# Patient Record
Sex: MALE | Employment: FULL TIME | URBAN - METROPOLITAN AREA
[De-identification: names, ages, dates, MRNs, and addresses within clinical notes are randomized per-mention and may not be internally consistent; named-entity substitution may affect disease eponyms.]

---

## 2017-07-21 ENCOUNTER — OFFICE VISIT (OUTPATIENT)
Dept: INTERNAL MEDICINE | Facility: MEDICAL CENTER | Age: 37
End: 2017-07-21
Payer: COMMERCIAL

## 2017-07-21 VITALS
HEART RATE: 90 BPM | HEIGHT: 70 IN | WEIGHT: 174 LBS | DIASTOLIC BLOOD PRESSURE: 76 MMHG | BODY MASS INDEX: 24.91 KG/M2 | OXYGEN SATURATION: 93 % | SYSTOLIC BLOOD PRESSURE: 130 MMHG | TEMPERATURE: 98.1 F

## 2017-07-21 DIAGNOSIS — M54.6 CHRONIC RIGHT-SIDED THORACIC BACK PAIN: ICD-10-CM

## 2017-07-21 DIAGNOSIS — G43.009 MIGRAINE WITHOUT AURA AND WITHOUT STATUS MIGRAINOSUS, NOT INTRACTABLE: ICD-10-CM

## 2017-07-21 DIAGNOSIS — Z76.89 ESTABLISHING CARE WITH NEW DOCTOR, ENCOUNTER FOR: ICD-10-CM

## 2017-07-21 DIAGNOSIS — G89.29 CHRONIC RIGHT-SIDED THORACIC BACK PAIN: ICD-10-CM

## 2017-07-21 PROCEDURE — 99204 OFFICE O/P NEW MOD 45 MIN: CPT | Mod: GC | Performed by: INTERNAL MEDICINE

## 2017-07-21 RX ORDER — TOPIRAMATE 50 MG/1
25 TABLET, FILM COATED ORAL 2 TIMES DAILY
Qty: 60 TAB | Refills: 3 | Status: SHIPPED | OUTPATIENT
Start: 2017-07-21 | End: 2021-06-25

## 2017-07-21 RX ORDER — AMITRIPTYLINE HYDROCHLORIDE 25 MG/1
25 TABLET, FILM COATED ORAL
Qty: 30 TAB | Refills: 2 | Status: SHIPPED | OUTPATIENT
Start: 2017-07-21 | End: 2021-06-25

## 2017-07-21 RX ORDER — RIZATRIPTAN BENZOATE 5 MG/1
5 TABLET ORAL
Qty: 10 TAB | Refills: 3 | Status: SHIPPED | OUTPATIENT
Start: 2017-07-21 | End: 2023-07-12

## 2017-07-21 ASSESSMENT — PATIENT HEALTH QUESTIONNAIRE - PHQ9: CLINICAL INTERPRETATION OF PHQ2 SCORE: 0

## 2017-07-21 NOTE — PATIENT INSTRUCTIONS
Please try to do you lab work, so we can have a better estimate and follow up for your health,    Please make an appointment with the physical therapy so they can help you controlling your pain,    Please call us in case you need ant thing

## 2017-07-21 NOTE — MR AVS SNAPSHOT
"        Rony Bridget   2017 10:30 AM   Office Visit   MRN: [id number]    Department:  [facility] Med - Internal Med   Dept Phone:  [phone]    Description:  Male : 1980   Provider:  Tena Malhotra M.D.           Reason for Visit     Establish Care     Migraine x10 years, also tension headaches x5 years    Back Pain upper back       Allergies as of 2017     No Known Allergies      You were diagnosed with     Migraine without aura and without status migrainosus, not intractable   [231667]       Chronic right-sided thoracic back pain   [5467554]       Establishing care with new doctor, encounter for   [383554]         Vital Signs     Blood Pressure Pulse Temperature Height Weight Body Mass Index    130/76 mmHg 90 36.7 °C (98.1 °F) 1.778 m (5' 10\") 78.926 kg (174 lb) 24.97 kg/m2    Oxygen Saturation Smoking Status                93% Never Smoker           Basic Information     Date Of Birth Sex Race Ethnicity Preferred Language    1980 Male Unable to Obtain Unknown English      Your appointments     Oct 12, 2017  1:15 PM   Established Patient with Tena Malhotra M.D.   [facility] Med - Internal Medicine (--)    [address] [place] [address] [phone]           You will be receiving a confirmation call a few days before your appointment from our automated call confirmation system.              Health Maintenance     Patient has no pending health maintenance at this time      Current Immunizations     No immunizations on file.      Below and/or attached are the medications your provider expects you to take. Review all of your home medications and newly ordered medications with your provider and/or pharmacist. Follow medication instructions as directed by your provider and/or pharmacist. Please keep your medication list with you and share with your provider. Update the information when medications are discontinued, doses are changed, or new " medications (including over-the-counter products) are added; and carry medication information at all times in the event of emergency situations     Allergies:  No Known Allergies          Medications  Valid as of: July 21, 2017 - 12:14 PM    Generic Name Brand Name Tablet Size Instructions for use    Amitriptyline HCl (Tab) ELAVIL 25 MG Take 1 Tab by mouth 1 time daily as needed.        Rizatriptan Benzoate (Tab) MAXALT 5 MG Take 1 Tab by mouth Once PRN for Migraine for up to 1 dose.        Topiramate (Tab) TOPAMAX 50 MG Take 0.5 Tabs by mouth 2 times a day.        .                 Medicines prescribed today were sent to:     [id number] - [place], [place] - [address] [facility]    [address] [place] [address] [place] [address]    Phone: [phone] Fax: [phone]    Open 24 Hours?: No      Medication refill instructions:       If your prescription bottle indicates you have medication refills left, it is not necessary to call your provider’s office. Please contact your pharmacy and they will refill your medication.    If your prescription bottle indicates you do not have any refills left, you may request refills at any time through one of the following ways: The online Guide system (except Urgent Care), by calling your provider’s office, or by asking your pharmacy to contact your provider’s office with a refill request. Medication refills are processed only during regular business hours and may not be available until the next business day. Your provider may request additional information or to have a follow-up visit with you prior to refilling your medication.   *Please Note: Medication refills are assigned a new Rx number when refilled electronically. Your pharmacy may indicate that no refills were authorized even though a new prescription for the same medication is available at the pharmacy. Please request the medicine by name with the pharmacy before contacting your provider for a refill.        Referral     A referral request  has been sent to our patient care coordination department. Please allow 3-5 business days for us to process this request and contact you either by phone or mail. If you do not hear from us by the 5th business day, please call us at [phone].        Instructions    Please try to do you lab work, so we can have a better estimate and follow up for your health,    Please make an appointment with the physical therapy so they can help you controlling your pain,    Please call us in case you need ant thing            MyChart Access Code: Activation code not generated  Current [employer] Status: Active

## 2017-07-21 NOTE — PROGRESS NOTES
"New Patient to Establish    Reason to establish: New patient to establish    CC: establish acre, consult about Migraine headache    HPI: 36-year-old male with previous medical history of migraine that started 10 years ago and it is controlled by medication, the patient also has a history of back pain that is not related to trauma or heavy weight lifting, the pain located on the back of the patient in about 1 inch on the right side between the vertebral column and the scapula, the patient describe the pain as a chronic pain with 3 out of 10 in severity that radiate to the right upper limb and neck, the patient is not using any medication for it and he had an evaluation study for this pain on 2004 by having a nerve study which came back normal, and also an MRI study for his back that showed as the patient described \"herniated disc but not to the degree of surgery indication\" we do not have the MRI report but the patient said he will try to have it because it has been done [place] a different state.  Today the patient presented to our clinic to establish care and to have a consultation about his back pain and about his Migraines, the back pain have not change in severity since 2004, but the migraine headache tends to be more frequent and sever lately, the patient was using sumatriptan, it does not seem to be effective recently,   The patient denies seeing flashing lights or any aura, the patient denies any increasing in back pain severity with weight lifting or excersize, he denies any trauma history.    Problem List for this patient  1.   Migraine without aura and without status migrainosus, not intractable  G43.009   2.   Chronic right-sided thoracic back pain  M54.6  3.   Establishing carewith new doctor, encounter for  Z71.89            History reviewed.past medical history.  1. Migraine headache without aura [G43.009]  2. Backache with radiationsince 2004. [M54.9]     Current Outpatient Prescriptions   Medication Sig " "Dispense Refill   • amitriptyline (ELAVIL) 25 MG Tab Take 1 Tab by mouth 1 time daily as needed. 30 Tab 2   • topiramate (TOPAMAX) 50 MG tablet Take 0.5 Tabs by mouth 2 times a day. 60 Tab 3   • rizatriptan (MAXALT) 5 MG tablet Take 1 Tab by mouth Once PRN for Migraine for up to 1 dose. 10 Tab 3     No current facility-administered medications for this visit.       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     Social History Main Topics   • Smoking status: Never Smoker    • Smokeless tobacco: Never Used   • Alcohol Use: Yes      Comment: socially   • Drug Use: No   • Sexual Activity: Not on file     Other Topics Concern   • Not on file     Social History Narrative   • No narrative on file       History reviewed. family history.  1. Mother, alive and well  2. Father, alive history of heart attach 5 years ago when he was 61 years old.  3: Sister, . Because of leukemia    History reviewed. No pertinent past surgical history.    ROS: As per HPI. Additional pertinent symptoms as noted below.    All others negative    /76 mmHg  Pulse 90  Temp(Src) 36.7 °C (98.1 °F)  Ht 1.778 m (5' 10\")  Wt 78.926 kg (174 lb)  BMI 24.97 kg/m2  SpO2 93%    Physical Exam  General:  Alert and oriented, No apparent distress.    Eyes: Pupils equal and reactive. No scleral icterus.    Throat: Clear no erythema or exudates noted.    Neck: Supple. No lymphadenopathy noted. Thyroid not enlarged.    Lungs: Clear to auscultation and percussion bilaterally.    Cardiovascular: Regular rate and rhythm. No murmurs, rubs or gallops.    Abdomen:  Benign. No rebound or guarding noted.    Extremities: No clubbing, cyanosis, edema.    Skin: Clear. No rash or " "suspicious skin lesions noted.    Other: no back tenderness or any skin redness or inflammation. The patient feel the pain radiate to his neck and to his right upper limbs sometimes, no pain or weakness, numbness for the neck or the upper limbs      Assessment and Plan    1. Migraine without aura and without status migrainosus, not intractable  The headache is mainly around the left eyeball and it is radiating to the whole left scalp, the patient declined seeing any flashing lights or aura, the patient declines any fever, neck stiffness or chills, the patient has been provided with a prescription of rizatriptan (MAXALT) 5 MG tablet, topiramate (TOPAMAX) 50 MG tablet, the patient also instructed to visit the clinic if he did not feel better.  2. Chronic right-sided thoracic back pain  The patient had a 10 years history of back pain,  the pain located on the back of the patient in about 1 inch on the right side between the vertebral column and the scapula, the patient describe the pain as a chronic pain with 3 out of 10 in severity that radiate to the right upper limb and neck, the patient is not using any medication for it and he had an evaluation study for this pain on 2004 by having a nerve study which came back normal, and also an MRI study for his back that showed as the patient described \"herniated disc but not to the degree of surgery indication\" we do not have the MRI report but the patient said he will try to have it because it has been done in a different state.   The assessment is that the patient might have a muscle spasm with trigger point, or might be something else, the plan is to prescribe amitriptyline (ELAVIL) 25 MG Tab to him and refer him to a physical therapy and according to the results we will evaluate and decide about the future plan.      3. 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      Followup: Return in about 3 months (around 10/21/2017).    Risk Assessment (discuss potential complications a function of chronic problems): the risk assessment has been discussed with the patient, see above please.    Complexity (discuss number of co-morbidities): the co-morbidities has been discussed with the patient, please see above.    Signed by: Tena Malhotra M.D.

## 2017-07-23 PROBLEM — G89.29 CHRONIC RIGHT-SIDED THORACIC BACK PAIN: Status: ACTIVE | Noted: 2017-07-23

## 2017-07-23 PROBLEM — Z76.89 ESTABLISHING CARE WITH NEW DOCTOR, ENCOUNTER FOR: Status: ACTIVE | Noted: 2017-07-23

## 2017-07-23 PROBLEM — M54.6 CHRONIC RIGHT-SIDED THORACIC BACK PAIN: Status: ACTIVE | Noted: 2017-07-23

## 2017-07-23 PROBLEM — G43.009 MIGRAINE WITHOUT AURA AND WITHOUT STATUS MIGRAINOSUS, NOT INTRACTABLE: Status: ACTIVE | Noted: 2017-07-23

## 2017-08-02 ENCOUNTER — OFFICE VISIT (OUTPATIENT)
Dept: INTERNAL MEDICINE | Facility: MEDICAL CENTER | Age: 37
End: 2017-08-02
Payer: COMMERCIAL

## 2017-08-02 VITALS
WEIGHT: 174 LBS | DIASTOLIC BLOOD PRESSURE: 80 MMHG | BODY MASS INDEX: 24.91 KG/M2 | HEIGHT: 70 IN | SYSTOLIC BLOOD PRESSURE: 125 MMHG | HEART RATE: 75 BPM | TEMPERATURE: 98.6 F | OXYGEN SATURATION: 96 %

## 2017-08-02 DIAGNOSIS — M54.6 CHRONIC RIGHT-SIDED THORACIC BACK PAIN: ICD-10-CM

## 2017-08-02 DIAGNOSIS — G89.29 CHRONIC RIGHT-SIDED THORACIC BACK PAIN: ICD-10-CM

## 2017-08-02 DIAGNOSIS — G44.209 TENSION HEADACHE: ICD-10-CM

## 2017-08-02 DIAGNOSIS — R07.0 THROAT PAIN IN ADULT: ICD-10-CM

## 2017-08-02 DIAGNOSIS — K21.9 GASTROESOPHAGEAL REFLUX DISEASE WITHOUT ESOPHAGITIS: ICD-10-CM

## 2017-08-02 PROCEDURE — 99214 OFFICE O/P EST MOD 30 MIN: CPT | Mod: GC | Performed by: INTERNAL MEDICINE

## 2017-08-02 RX ORDER — NAPROXEN 500 MG/1
500 TABLET ORAL 2 TIMES DAILY WITH MEALS
Qty: 60 TAB | Refills: 1 | Status: SHIPPED | OUTPATIENT
Start: 2017-08-02 | End: 2021-06-25

## 2017-08-02 RX ORDER — RANITIDINE 150 MG/1
150 TABLET ORAL 2 TIMES DAILY
Qty: 60 TAB | Refills: 11 | Status: SHIPPED | OUTPATIENT
Start: 2017-08-02 | End: 2021-06-25

## 2017-08-02 RX ORDER — TIZANIDINE 4 MG/1
4 TABLET ORAL EVERY 6 HOURS PRN
Qty: 30 TAB | Refills: 3 | Status: SHIPPED | OUTPATIENT
Start: 2017-08-02 | End: 2021-06-25

## 2017-08-02 ASSESSMENT — PAIN SCALES - GENERAL: PAINLEVEL: 4=SLIGHT-MODERATE PAIN

## 2017-08-02 NOTE — PATIENT INSTRUCTIONS
Please do not drink coffee or tea too much because that will make the muscle spasm worse,    Please use a soft collar around your neck especially at night with some heat pads,    Please take the extra strength tylenol from the famrnacy to use it with other medications to treat your headache.    Try to hydrate yourself and having multiple snacks during the day , as the hypoglycemia and the dehydration will worsen the headache.

## 2017-08-02 NOTE — MR AVS SNAPSHOT
"Rony Gill   2017 3:45 PM   Office Visit   MRN: [id number]    Department:  [facility] Med - Internal Med   Dept Phone:  [phone]    Description:  Male : 1980   Provider:  Tena Malhotra M.D.           Reason for Visit     Headache throat pain x 10 days       Allergies as of 2017     No Known Allergies      You were diagnosed with     Tension headache   [307.81.ICD-9-CM]       Chronic right-sided thoracic back pain   [7773239]       Throat pain in adult   [9922077]       Gastroesophageal reflux disease without esophagitis   [356373]         Vital Signs     Blood Pressure Pulse Temperature Height Weight Body Mass Index    125/80 mmHg 75 37 °C (98.6 °F) 1.778 m (5' 10\") 78.926 kg (174 lb) 24.97 kg/m2    Oxygen Saturation Smoking Status                96% Never Smoker           Basic Information     Date Of Birth Sex Race Ethnicity Preferred Language    1980 Male Unable to Obtain Unknown English      Your appointments     Aug 04, 2017 10:30 AM   PT New Evaluation 60 Minutes with JEANETTE Quispe   [facility] ([address])    [address] [place] [id number] [phone]           Please bring Photo ID, Insurance Cards, list of all Medication and copies of any legal documents (such as Living Will, Power of ) If speaking a language besides English please bring an adult . Please arrive 30 minutes prior for check in and registration.            Aug 11, 2017 10:15 AM   PT Follow Up 30 Minutes with JOSÉ LUIS [facility] ([address])    [address] [place] [address]   [phone]            Aug 18, 2017  9:45 AM   PT Follow Up 30 Minutes with JOSÉ LUIS [facility] ([address])    [address] [place] [address]   [phone]            Aug 25, 2017  9:45 AM   PT Follow Up 30 Minutes with Mauri LOCK[facility] [address]    [address]  [address]  [place] [address]   [phone]            Sep 01, 2017  9:45 AM   PT Follow Up 30 Minutes with JOSÉ LUIS [facility] [address]    [address]  [address]  [place] [address]   [phone]            Sep 08, 2017 10:15 AM   PT Follow Up 30 Minutes with JOSÉ LUIS [facility] [address]    [address]  [address]  [place] [address]   [phone]            Oct 12, 2017  1:15 PM   Established Patient with Tena Malhotra M.D.   [facility] Med - Internal Medicine (--)    [address]  [address]  [facility] [address]   [phone]           You will be receiving a confirmation call a few days before your appointment from our automated call confirmation system.              Problem List              ICD-10-CM Priority Class Noted - Resolved    Migraine without aura and without status migrainosus, not intractable G43.009   7/23/2017 - Present    Chronic right-sided thoracic back pain M54.6, G89.29   7/23/2017 - Present    Establishing care with new doctor, encounter for Z71.89   7/23/2017 - Present    Gastroesophageal reflux disease without esophagitis K21.9   8/2/2017 - Present      Health Maintenance        Date Due Completion Dates    IMM DTaP/Tdap/Td Vaccine (1 - Tdap) 11/27/2001 ---    IMM INFLUENZA (1) 9/1/2017 ---            Current Immunizations     No immunizations on file.      Below and/or attached are the medications your provider expects you to take. Review all of your home medications and newly ordered medications with your provider and/or pharmacist. Follow medication instructions as directed by your provider and/or pharmacist. Please keep your medication list with you and share with your provider. Update the information when medications are discontinued, doses are changed, or  new medications (including over-the-counter products) are added; and carry medication information at all times in the event of emergency situations     Allergies:  No Known Allergies          Medications  Valid as of: August 02, 2017 -  4:52 PM    Generic Name Brand Name Tablet Size Instructions for use    Amitriptyline HCl (Tab) ELAVIL 25 MG Take 1 Tab by mouth 1 time daily as needed.        Naproxen (Tab) NAPROSYN 500 MG Take 1 Tab by mouth 2 times a day, with meals.        RaNITidine HCl (Tab) ZANTAC 150 MG Take 1 Tab by mouth 2 times a day.        Rizatriptan Benzoate (Tab) MAXALT 5 MG Take 1 Tab by mouth Once PRN for Migraine for up to 1 dose.        TiZANidine HCl (Tab) ZANAFLEX 4 MG Take 1 Tab by mouth every 6 hours as needed.        Topiramate (Tab) TOPAMAX 50 MG Take 0.5 Tabs by mouth 2 times a day.        .                 Medicines prescribed today were sent to:     [facility] [address] [address]    Phone: [phone] Fax: [phone]    Open 24 Hours?: No      Medication refill instructions:       If your prescription bottle indicates you have medication refills left, it is not necessary to call your provider’s office. Please contact your pharmacy and they will refill your medication.    If your prescription bottle indicates you do not have any refills left, you may request refills at any time through one of the following ways: The online [employer] system (except Urgent Care), by calling your provider’s office, or by asking your pharmacy to contact your provider’s office with a refill request. Medication refills are processed only during regular business hours and may not be available until the next business day. Your provider may request additional information or to have a follow-up visit with you prior to refilling your medication.   *Please Note: Medication refills are assigned a new Rx number when refilled electronically. Your pharmacy may indicate  that no refills were authorized even though a new prescription for the same medication is available at the pharmacy. Please request the medicine by name with the pharmacy before contacting your provider for a refill.        Instructions    Please do not drink coffee or tea too much because that will make the muscle spasm worse,    Please use a soft collar around your neck especially at night with some heat pads,    Please take the extra strength tylenol from the famrnacy to use it with other medications to treat your headache.    Try to hydrate yourself and having multiple snakes during the day , as the hypoglycemia and the dehydration will worsen the headache.              [employer] Access Code: Activation code not generated  Current [employer] Status: Active

## 2017-08-03 NOTE — PROGRESS NOTES
"      Established Patient    Rony presents today with the following:    CC: headache, and throat pain for 10 days    HPI: 36-year-old male with previous medical history of migraine that started 10 years ago and it is controlled by medications. The patient also has a history of back pain that is not related to trauma or heavy weight lifting. The pain located on the back of the patient in about 1 inch on the right side between the vertebral column and the scapula. The patient describe the pain as a chronic pain with 3 out of 10 in severity that radiate to the right upper limb and neck. The patient had used amitriptyline for it but it was not beneficial. The patient had an evaluation study for this pain on 2004 by having a nerve study which came back normal. The patient also had an MRI study for his back which showed \"herniated disc but not to the degree of surgery indication\" as the patient described. We do not have the MRI report but the patient says that he will try to have it. The MRI had been done in a different state.  Today the patient visits our clinic to follow up on his headache and to have a consult about throat pain. The throat pain started 10 days ago. The back pain has not change in severity since 2004. The migraine headache is improving but the patient experience a diffuse headache as a rubber band around his head.   The patient does have a history of GERD that is on and off in onset. He is using over the counter medication for GERD whenever he needs to.   The patient denies seeing flashing lights or aura. The patient denies any increasing in back pain severity with weight lifting or exercise. He denies any trauma history, fever, neck stiffness, cough, vomiting, nausea, ill contact, or recent travel.    Patient Active Problem List    Diagnosis Date Noted   • Gastroesophageal reflux disease without esophagitis 08/02/2017   • Tension headache 08/02/2017   • Throat pain in adult 08/02/2017   • Migraine " "without aura and without status migrainosus, not intractable 07/23/2017   • Chronic right-sided thoracic back pain 07/23/2017   • Establishing care with new doctor, encounter for 07/23/2017       Current Outpatient Prescriptions   Medication Sig Dispense Refill   • tizanidine (ZANAFLEX) 4 MG Tab Take 1 Tab by mouth every 6 hours as needed. 30 Tab 3   • naproxen (NAPROSYN) 500 MG Tab Take 1 Tab by mouth 2 times a day, with meals. 60 Tab 1   • ranitidine (ZANTAC) 150 MG Tab Take 1 Tab by mouth 2 times a day. 60 Tab 11   • rizatriptan (MAXALT) 5 MG tablet Take 1 Tab by mouth Once PRN for Migraine for up to 1 dose. 10 Tab 3   • amitriptyline (ELAVIL) 25 MG Tab Take 1 Tab by mouth 1 time daily as needed. 30 Tab 2   • topiramate (TOPAMAX) 50 MG tablet Take 0.5 Tabs by mouth 2 times a day. 60 Tab 3     No current facility-administered medications for this visit.       ROS: As per HPI. Additional pertinent symptoms as noted below.    All others negative    /80 mmHg  Pulse 75  Temp(Src) 37 °C (98.6 °F)  Ht 1.778 m (5' 10\")  Wt 78.926 kg (174 lb)  BMI 24.97 kg/m2  SpO2 96%    Physical Exam   Constitutional:  oriented to person, place, and time. No distress.   Eyes: Pupils are equal, round, and reactive to light. No scleral icterus.  Neck: Neck supple. No thyromegaly present.   Cardiovascular: Normal rate, regular rhythm and normal heart sounds.  Exam reveals no gallop and no friction rub.  No murmur heard.  Pulmonary/Chest: Breath sounds normal. Chest wall is not dull to percussion.   Musculoskeletal:   no edema.   Lymphadenopathy: no cervical adenopathy  Neurological: alert and oriented to person, place, and time.   Skin: No cyanosis. Nails show no clubbing.  Other:    Note: I have reviewed all pertinent labs and diagnostic tests associated with this visit with specific comments listed under the assessment and plan below    Assessment and Plan    1. Tension headache  The patient is currently complaining of " diffuse headache as a tight band around his head, with no photophobia, lacrimation, or visual disturbances.  Hx of relief with NSAIDs.  Clinical exam negative for any tenderness over sinuses, No focal neurologic deficits.  Impression: Symptoms and findings are suggestive of Tension headache, or possibly other forms of migraine.  Plan:  Tab Naproxen 500mg prn.  Advised to use soft collar.     2. Chronic right-sided thoracic back pain  The patient had the back pain for 8 years. No hx of trauma or shingles.  Clinical Exam negative for any rash, deformity, or restriction of movements.  However generalized tenderness over muscles on palpation.  Etiology of pain is unclear; likely to fibromyalgia.  Plan:  Counseled patient for Yoga and exercise.  Tab Naproxen 500 mg.  Referral to PT given.    3. Throat pain in adult:  Hx of throat pain for 10 days, with no cough fever or chills.  Hx of GERD present.  Clinically No congestion, no lymphadenopathy, afebrile.  Possibly acid reflux causing chemical pharyngitis.  Plan:   Ranitidin.  Advised pt to avoid Caffeine, Ice-creams, chocolates.    4. Gastroesophageal reflux disease without esophagitis  Hx of heart burn, reflux symptoms off and on, relieved on taking Ranitidin and OTC meds.  Declined any hx of chest pain, shortness of breath.  Clinical exam: Unremarkable  Impression: Symptoms suggestive of Chr GERD.  Plan:  Ranitidin.  Advised pt to avoid Caffeine, Ice-creams, chocolates.        Followup: Return in about 3 months (around 11/2/2017).      Signed by: Tena Malhotra M.D.

## 2017-08-04 ENCOUNTER — HOSPITAL ENCOUNTER (OUTPATIENT)
Dept: PHYSICAL THERAPY | Facility: REHABILITATION | Age: 37
End: 2017-08-04
Attending: STUDENT IN AN ORGANIZED HEALTH CARE EDUCATION/TRAINING PROGRAM
Payer: COMMERCIAL

## 2017-08-04 PROCEDURE — 97162 PT EVAL MOD COMPLEX 30 MIN: CPT

## 2017-08-04 PROCEDURE — 97012 MECHANICAL TRACTION THERAPY: CPT

## 2017-08-08 ENCOUNTER — HOSPITAL ENCOUNTER (OUTPATIENT)
Dept: PHYSICAL THERAPY | Facility: REHABILITATION | Age: 37
End: 2017-08-08
Attending: STUDENT IN AN ORGANIZED HEALTH CARE EDUCATION/TRAINING PROGRAM
Payer: COMMERCIAL

## 2017-08-08 PROCEDURE — 97140 MANUAL THERAPY 1/> REGIONS: CPT

## 2017-08-08 PROCEDURE — 97012 MECHANICAL TRACTION THERAPY: CPT

## 2017-08-08 PROCEDURE — 97110 THERAPEUTIC EXERCISES: CPT

## 2017-08-10 ENCOUNTER — HOSPITAL ENCOUNTER (OUTPATIENT)
Dept: PHYSICAL THERAPY | Facility: REHABILITATION | Age: 37
End: 2017-08-10
Attending: STUDENT IN AN ORGANIZED HEALTH CARE EDUCATION/TRAINING PROGRAM
Payer: COMMERCIAL

## 2017-08-10 PROCEDURE — 97014 ELECTRIC STIMULATION THERAPY: CPT

## 2017-08-10 PROCEDURE — 97140 MANUAL THERAPY 1/> REGIONS: CPT

## 2017-08-11 ENCOUNTER — APPOINTMENT (OUTPATIENT)
Dept: PHYSICAL THERAPY | Facility: REHABILITATION | Age: 37
End: 2017-08-11
Attending: STUDENT IN AN ORGANIZED HEALTH CARE EDUCATION/TRAINING PROGRAM
Payer: COMMERCIAL

## 2017-08-15 ENCOUNTER — HOSPITAL ENCOUNTER (OUTPATIENT)
Dept: PHYSICAL THERAPY | Facility: REHABILITATION | Age: 37
End: 2017-08-15
Attending: STUDENT IN AN ORGANIZED HEALTH CARE EDUCATION/TRAINING PROGRAM
Payer: COMMERCIAL

## 2017-08-15 PROCEDURE — 97140 MANUAL THERAPY 1/> REGIONS: CPT

## 2017-08-15 PROCEDURE — 97014 ELECTRIC STIMULATION THERAPY: CPT

## 2017-08-15 PROCEDURE — 97110 THERAPEUTIC EXERCISES: CPT

## 2017-08-18 ENCOUNTER — APPOINTMENT (OUTPATIENT)
Dept: PHYSICAL THERAPY | Facility: REHABILITATION | Age: 37
End: 2017-08-18
Attending: STUDENT IN AN ORGANIZED HEALTH CARE EDUCATION/TRAINING PROGRAM
Payer: COMMERCIAL

## 2017-08-22 ENCOUNTER — HOSPITAL ENCOUNTER (OUTPATIENT)
Dept: PHYSICAL THERAPY | Facility: REHABILITATION | Age: 37
End: 2017-08-22
Attending: STUDENT IN AN ORGANIZED HEALTH CARE EDUCATION/TRAINING PROGRAM
Payer: COMMERCIAL

## 2017-08-22 PROCEDURE — 97110 THERAPEUTIC EXERCISES: CPT

## 2017-08-22 PROCEDURE — 97140 MANUAL THERAPY 1/> REGIONS: CPT

## 2017-08-25 ENCOUNTER — APPOINTMENT (OUTPATIENT)
Dept: PHYSICAL THERAPY | Facility: REHABILITATION | Age: 37
End: 2017-08-25
Attending: STUDENT IN AN ORGANIZED HEALTH CARE EDUCATION/TRAINING PROGRAM
Payer: COMMERCIAL

## 2017-09-01 ENCOUNTER — APPOINTMENT (OUTPATIENT)
Dept: PHYSICAL THERAPY | Facility: REHABILITATION | Age: 37
End: 2017-09-01
Attending: STUDENT IN AN ORGANIZED HEALTH CARE EDUCATION/TRAINING PROGRAM
Payer: COMMERCIAL

## 2017-09-08 ENCOUNTER — APPOINTMENT (OUTPATIENT)
Dept: PHYSICAL THERAPY | Facility: REHABILITATION | Age: 37
End: 2017-09-08
Attending: STUDENT IN AN ORGANIZED HEALTH CARE EDUCATION/TRAINING PROGRAM
Payer: COMMERCIAL

## 2017-10-12 ENCOUNTER — APPOINTMENT (OUTPATIENT)
Dept: INTERNAL MEDICINE | Facility: MEDICAL CENTER | Age: 37
End: 2017-10-12
Payer: COMMERCIAL

## 2019-06-21 ENCOUNTER — HOSPITAL ENCOUNTER (OUTPATIENT)
Dept: RADIOLOGY | Facility: MEDICAL CENTER | Age: 39
End: 2019-06-21
Attending: PHYSICIAN ASSISTANT
Payer: COMMERCIAL

## 2019-06-21 DIAGNOSIS — M25.552 LEFT HIP PAIN: ICD-10-CM

## 2019-06-21 PROCEDURE — 73502 X-RAY EXAM HIP UNI 2-3 VIEWS: CPT | Mod: LT

## 2021-06-25 ENCOUNTER — OFFICE VISIT (OUTPATIENT)
Dept: URGENT CARE | Facility: CLINIC | Age: 41
End: 2021-06-25
Payer: COMMERCIAL

## 2021-06-25 VITALS
OXYGEN SATURATION: 96 % | HEIGHT: 70 IN | WEIGHT: 183 LBS | SYSTOLIC BLOOD PRESSURE: 110 MMHG | DIASTOLIC BLOOD PRESSURE: 80 MMHG | HEART RATE: 90 BPM | TEMPERATURE: 97.2 F | BODY MASS INDEX: 26.2 KG/M2 | RESPIRATION RATE: 16 BRPM

## 2021-06-25 DIAGNOSIS — H61.23 BILATERAL IMPACTED CERUMEN: ICD-10-CM

## 2021-06-25 PROCEDURE — 69210 REMOVE IMPACTED EAR WAX UNI: CPT | Performed by: PHYSICIAN ASSISTANT

## 2021-06-25 RX ORDER — OFLOXACIN 3 MG/ML
5 SOLUTION AURICULAR (OTIC) DAILY
Qty: 10 ML | Refills: 0 | Status: SHIPPED | OUTPATIENT
Start: 2021-06-25 | End: 2022-01-04

## 2021-06-25 ASSESSMENT — ENCOUNTER SYMPTOMS
FEVER: 0
SORE THROAT: 0
CHILLS: 0
COUGH: 0
ABDOMINAL PAIN: 0
VOMITING: 0
NAUSEA: 0
DIARRHEA: 0

## 2021-06-25 NOTE — PROGRESS NOTES
Subjective:     Rony Gill  is a 40 y.o. male who presents for Ear Pain ((L) Ear. x2-3 weeks Ear is fully blocked, pt reports he can barely hear out of it. Pain comes and goes. Pt has had this wax build up previously on (L) Ear. )      HPI    Patient presents urgent care complaining of fullness and muffled sounds to left ear times last 2 to 3 weeks.  Patient notes some waxing and waning of symptoms prior to complete loss of hearing on left side.  Notes past medical history of cerumen impaction and suspects as much.  Denies symptoms to right ear.  Denies fevers chills or cough.  Denies nausea vomiting abdominal pain diarrhea or rash.  Notes that onset felt mild fatigue, now resolved.  Tried OTC eardrops for wax with no improvement.    Review of Systems   Constitutional: Negative for chills and fever.   HENT: Positive for ear pain ( mild discomfort, left) and hearing loss. Negative for ear discharge, sore throat and tinnitus.    Respiratory: Negative for cough.    Gastrointestinal: Negative for abdominal pain, diarrhea, nausea and vomiting.   Skin: Negative for rash.       Medications:    • amitriptyline Tabs  • naproxen Tabs  • PEPCID PO  • raNITidine Tabs  • rizatriptan  • tizanidine Tabs  • topiramate    Allergies: Patient has no known allergies.    Problem List: Rony Gill does not have any pertinent problems on file.    Surgical History:  No past surgical history on file.    Past Social Hx: Rony Gill  reports that he has never smoked. He has never used smokeless tobacco. He reports current alcohol use. He reports that he does not use drugs.     Past Family Hx:  Rony Gill family history includes Heart Attack in his father; Leukemia in his sister; No Known Problems in his mother.     Problem list, medications, and allergies reviewed by myself today in Epic.     Objective:   /80 (BP Location: Left arm, Patient Position: Sitting, BP Cuff Size: Adult)   Pulse 90   Temp 36.2 °C (97.2 °F) (Temporal)    "Resp 16   Ht 1.778 m (5' 10\")   Wt 83 kg (183 lb)   SpO2 96%   BMI 26.26 kg/m²     Physical Exam  Vitals and nursing note reviewed.   Constitutional:       General: He is not in acute distress.     Appearance: He is well-developed. He is not diaphoretic.   HENT:      Head: Normocephalic and atraumatic.      Right Ear: External ear normal. There is impacted cerumen.      Left Ear: External ear normal. There is impacted cerumen.      Nose: Nose normal.      Mouth/Throat:      Pharynx: Uvula midline. Posterior oropharyngeal erythema ( mild PND) present. No oropharyngeal exudate.      Tonsils: No tonsillar abscesses.   Eyes:      General: No scleral icterus.        Right eye: No discharge.         Left eye: No discharge.      Conjunctiva/sclera: Conjunctivae normal.   Pulmonary:      Effort: Pulmonary effort is normal. No respiratory distress.      Breath sounds: No decreased breath sounds, wheezing, rhonchi or rales.   Musculoskeletal:         General: Normal range of motion.      Cervical back: Neck supple.   Lymphadenopathy:      Cervical: No cervical adenopathy.   Skin:     General: Skin is warm and dry.      Coloration: Skin is not pale.   Neurological:      Mental Status: He is alert and oriented to person, place, and time.      Coordination: Coordination normal.       Procedure: Cerumen Removal  Risks and benefits of procedure discussed  Cerumen removed with curette and lavage after softening agent instilled  Patient tolerated well  Post procedure exam with clear canals and normal TMs      Assessment/Plan:   Assessment      1. Bilateral impacted cerumen    Other orders  - Famotidine (PEPCID PO); Take  by mouth.  Techniques of self clearance reviewed with patient  Return to clinic with lack of resolution or progression of symptoms.      I have worn an N95 mask, gloves and eye protection for the entire encounter with this patient.     Differential diagnosis, natural history, supportive care, and indications for " immediate follow-up discussed.

## 2021-12-31 ENCOUNTER — TELEPHONE (OUTPATIENT)
Dept: SCHEDULING | Facility: IMAGING CENTER | Age: 41
End: 2021-12-31

## 2022-01-04 ENCOUNTER — OFFICE VISIT (OUTPATIENT)
Dept: MEDICAL GROUP | Facility: PHYSICIAN GROUP | Age: 42
End: 2022-01-04
Payer: COMMERCIAL

## 2022-01-04 VITALS
SYSTOLIC BLOOD PRESSURE: 112 MMHG | WEIGHT: 176 LBS | RESPIRATION RATE: 16 BRPM | BODY MASS INDEX: 25.2 KG/M2 | TEMPERATURE: 97.2 F | HEART RATE: 102 BPM | DIASTOLIC BLOOD PRESSURE: 76 MMHG | OXYGEN SATURATION: 97 % | HEIGHT: 70 IN

## 2022-01-04 DIAGNOSIS — G43.909 MIGRAINE SYNDROME: ICD-10-CM

## 2022-01-04 DIAGNOSIS — K21.9 GASTROESOPHAGEAL REFLUX DISEASE, UNSPECIFIED WHETHER ESOPHAGITIS PRESENT: ICD-10-CM

## 2022-01-04 DIAGNOSIS — R10.13 EPIGASTRIC PAIN: ICD-10-CM

## 2022-01-04 PROBLEM — M54.6 CHRONIC RIGHT-SIDED THORACIC BACK PAIN: Status: RESOLVED | Noted: 2017-07-23 | Resolved: 2022-01-04

## 2022-01-04 PROBLEM — G89.29 CHRONIC RIGHT-SIDED THORACIC BACK PAIN: Status: RESOLVED | Noted: 2017-07-23 | Resolved: 2022-01-04

## 2022-01-04 PROBLEM — G44.209 TENSION HEADACHE: Status: RESOLVED | Noted: 2017-08-02 | Resolved: 2022-01-04

## 2022-01-04 PROCEDURE — 99204 OFFICE O/P NEW MOD 45 MIN: CPT | Performed by: INTERNAL MEDICINE

## 2022-01-04 RX ORDER — PANTOPRAZOLE SODIUM 40 MG/1
40 TABLET, DELAYED RELEASE ORAL DAILY
Qty: 30 TABLET | Refills: 2 | Status: SHIPPED | OUTPATIENT
Start: 2022-01-04 | End: 2022-02-11

## 2022-01-04 ASSESSMENT — PATIENT HEALTH QUESTIONNAIRE - PHQ9: CLINICAL INTERPRETATION OF PHQ2 SCORE: 0

## 2022-01-04 NOTE — PROGRESS NOTES
Subjective:     CC: Establish care    HISTORY OF THE PRESENT ILLNESS: Patient is a 41 y.o. male. This pleasant patient is here today to establish care and discuss the following issues:    The patient reports chronic acid reflux symptoms for which he takes Pepcid on a daily basis.  He has not had a prior EGD.  He reports 2 episodes of acute epigastric pain.  The most recent episode has progressed over the last week.  He reports associated decreased appetite, weakness, mild nausea.  No fevers or vomiting.  Prior to this current episode, his last episode was approximately 1 month ago.  It lasted a couple of days.  He states he will have some loose stool and then he feels better, but does not note chronic constipation or diarrhea.  He thinks that food intake makes it worse.  He does report a long standing history of mild recurrent abdominal pain, but these 2 episodes are more severe in nature.  He has increased his Pepcid to 2 pills daily and has been taking Tums on an as-needed basis.    Allergies: Patient has no known allergies.    Current Outpatient Medications Ordered in Epic   Medication Sig Dispense Refill   • pantoprazole (PROTONIX) 40 MG Tablet Delayed Response Take 1 Tablet by mouth every day. 30 Tablet 2   • Famotidine (PEPCID PO) Take  by mouth.     • rizatriptan (MAXALT) 5 MG tablet Take 1 Tab by mouth Once PRN for Migraine for up to 1 dose. 10 Tab 3     No current Epic-ordered facility-administered medications on file.       Past Medical History:   Diagnosis Date   • Backache with radiation 2004   • Chronic right-sided thoracic back pain 7/23/2017   • Migraine headache without aura    • Tension headache 8/2/2017       History reviewed. No pertinent surgical history.    Social History     Tobacco Use   • Smoking status: Never Smoker   • Smokeless tobacco: Never Used   Substance Use Topics   • Alcohol use: Yes     Comment: socially   • Drug use: No       Social History     Social History Narrative   • Not on  "file       Family History   Problem Relation Age of Onset   • Leukemia Sister    • No Known Problems Mother    • Heart Attack Father         5 years ago when he was 63 Y old       Health Maintenance: Completed    ROS:   Gen: no fevers/chills  Pulm: no sob, no cough  CV: no chest pain, no palpitations  GI: Per HPI  Neuro: no headaches, no numbness/tingling      Objective:     Exam: /76   Pulse (!) 102   Temp 36.2 °C (97.2 °F)   Resp 16   Ht 1.778 m (5' 10\")   Wt 79.8 kg (176 lb)   SpO2 97%  Body mass index is 25.25 kg/m².    General: Normal appearing. No distress.  HEENT: Normocephalic. Eyes conjunctiva clear lids without ptosis, pupils equal and reactive to light accommodation, oropharynx is without erythema, edema or exudates.   Pulmonary: Clear to ausculation.  Normal effort. No rales, ronchi, or wheezing.  Cardiovascular: Regular rate and rhythm without murmur.   Abdomen: Soft, nontender, nondistended.   Musculoskeletal: No extremity cyanosis, clubbing, or edema.  Psych: Normal mood and affect. Alert and oriented x3. Judgment and insight is normal.    Assessment & Plan:   41 y.o. male with the following -    Epigastric pain  Acute, progressive.  Patient reports 2 episodes of acute epigastric pain with associated decreased appetite, mild nausea, and weakness.  He does have a history of chronic mild intermittent abdominal pain.  He also has acid reflux symptoms for which she takes Pepcid daily.  He denies constipation or diarrhea, but states he will have a loose stool during these episodes which improves his symptoms.  - CBC WITH DIFFERENTIAL; Future  - Comp Metabolic Panel; Future  - H. PYLORI, UREA BREATH TEST, ADULT; Future  - pantoprazole (PROTONIX) 40 MG Tablet Delayed Response; Take 1 Tablet by mouth every day.  Dispense: 30 Tablet; Refill: 2  - [id number] VIEWS; Future  - CELIAC DISEASE AB PANEL; Future    Gastroesophageal reflux disease, unspecified whether esophagitis present  Chronic, " ongoing.  Patient has been diagnosed with GERD clinically, he has not had an EGD.  He takes famotidine daily.  He reports his symptoms have been well controlled.  -Transition to pantoprazole daily given reports of increasing epigastric pain    Migraine syndrome  Chronic, recurrent.  Patient takes rizatriptan as needed for his headaches.  He rarely requires this medication, he cannot recall the last time he had to take it.  -Continue rizatriptan 5 mg as needed for acute migraine      Return in about 1 week (around 1/11/2022) for Epigastric pain.    Please note that this dictation was created using voice recognition software. I have made every reasonable attempt to correct obvious errors, but I expect that there are errors of grammar and possibly content that I did not discover before finalizing the note.

## 2022-01-04 NOTE — PATIENT INSTRUCTIONS
Get lab work done.  Stop Pepcid for 24 hours and then take H. pylori breath test.  After H. pylori breath test, start pantoprazole 40 mg daily.  Schedule abdominal x-ray.

## 2022-01-05 ENCOUNTER — HOSPITAL ENCOUNTER (OUTPATIENT)
Dept: RADIOLOGY | Facility: MEDICAL CENTER | Age: 42
End: 2022-01-05
Attending: INTERNAL MEDICINE
Payer: COMMERCIAL

## 2022-01-05 DIAGNOSIS — R10.13 EPIGASTRIC PAIN: ICD-10-CM

## 2022-01-05 PROCEDURE — 74019 RADEX ABDOMEN 2 VIEWS: CPT

## 2022-01-06 PROBLEM — G43.909 MIGRAINE SYNDROME: Status: ACTIVE | Noted: 2017-07-23

## 2022-01-06 LAB — UREA BREATH TEST QL: NEGATIVE

## 2022-01-12 ENCOUNTER — OFFICE VISIT (OUTPATIENT)
Dept: MEDICAL GROUP | Facility: PHYSICIAN GROUP | Age: 42
End: 2022-01-12
Payer: COMMERCIAL

## 2022-01-12 VITALS
SYSTOLIC BLOOD PRESSURE: 118 MMHG | WEIGHT: 176 LBS | OXYGEN SATURATION: 100 % | DIASTOLIC BLOOD PRESSURE: 62 MMHG | HEIGHT: 70 IN | BODY MASS INDEX: 25.2 KG/M2 | TEMPERATURE: 97.8 F | RESPIRATION RATE: 14 BRPM | HEART RATE: 96 BPM

## 2022-01-12 DIAGNOSIS — R10.13 EPIGASTRIC PAIN: ICD-10-CM

## 2022-01-12 DIAGNOSIS — K21.9 GASTROESOPHAGEAL REFLUX DISEASE, UNSPECIFIED WHETHER ESOPHAGITIS PRESENT: ICD-10-CM

## 2022-01-12 PROCEDURE — 99214 OFFICE O/P EST MOD 30 MIN: CPT | Performed by: INTERNAL MEDICINE

## 2022-01-12 NOTE — PROGRESS NOTES
"Subjective:     CC:   Chief Complaint   Patient presents with   • GI Problem     follow up         HPI:   Rony presents today to discuss the following issues:    The patient reports his pain started to improve at the end of last week, but then returned over the last couple of days.  He has not been able to tie it to any particular food.  He did start taking the pantoprazole.      Past Medical History:   Diagnosis Date   • Backache with radiation 2004   • Chronic right-sided thoracic back pain 7/23/2017   • Migraine headache without aura    • Tension headache 8/2/2017       Social History     Tobacco Use   • Smoking status: Never Smoker   • Smokeless tobacco: Never Used   Substance Use Topics   • Alcohol use: Yes     Comment: socially   • Drug use: No       Current Outpatient Medications Ordered in Epic   Medication Sig Dispense Refill   • pantoprazole (PROTONIX) 40 MG Tablet Delayed Response Take 1 Tablet by mouth every day. 30 Tablet 2   • Famotidine (PEPCID PO) Take  by mouth.     • rizatriptan (MAXALT) 5 MG tablet Take 1 Tab by mouth Once PRN for Migraine for up to 1 dose. 10 Tab 3     No current Epic-ordered facility-administered medications on file.       Allergies:  Patient has no known allergies.    Health Maintenance: Completed    ROS:   Denies any recent fevers or chills. No nausea or vomiting. No chest pains or shortness of breath.      Objective:       Exam:  /62   Pulse 96   Temp 36.6 °C (97.8 °F)   Resp 14   Ht 1.778 m (5' 10\")   Wt 79.8 kg (176 lb)   SpO2 100%   BMI 25.25 kg/m²  Body mass index is 25.25 kg/m².    Gen: Alert and oriented, No apparent distress.      Assessment & Plan:     41 y.o. male with the following -     Epigastric pain  Gastroesophageal reflux disease, unspecified whether esophagitis present  This is a chronic medical condition.  Ongoing.  The patient had labs done, but the results have not been sent to me.  H. pylori breath test on 1/5/2022 was negative.  Abdominal " x-ray on 1/5/2022 showed no evidence of bowel obstruction, it did show moderate amount of stool within the colon.  Will refer to gastroenterology for further evaluation.  -Continue pantoprazole 40 mg daily  - Referral to Gastroenterology    Return in about 3 months (around 4/12/2022) for Following GI consult.    Please note that this dictation was created using voice recognition software. I have made every reasonable attempt to correct obvious errors, but I expect that there are errors of grammar and possibly content that I did not discover before finalizing the note.

## 2022-01-15 ENCOUNTER — HOSPITAL ENCOUNTER (OUTPATIENT)
Facility: MEDICAL CENTER | Age: 42
End: 2022-01-15
Attending: PHYSICIAN ASSISTANT
Payer: COMMERCIAL

## 2022-01-15 ENCOUNTER — OFFICE VISIT (OUTPATIENT)
Dept: URGENT CARE | Facility: CLINIC | Age: 42
End: 2022-01-15
Payer: COMMERCIAL

## 2022-01-15 VITALS
SYSTOLIC BLOOD PRESSURE: 112 MMHG | RESPIRATION RATE: 16 BRPM | HEART RATE: 110 BPM | BODY MASS INDEX: 25.2 KG/M2 | TEMPERATURE: 97.2 F | DIASTOLIC BLOOD PRESSURE: 80 MMHG | WEIGHT: 176 LBS | OXYGEN SATURATION: 98 % | HEIGHT: 70 IN

## 2022-01-15 DIAGNOSIS — R10.13 EPIGASTRIC ABDOMINAL PAIN: ICD-10-CM

## 2022-01-15 DIAGNOSIS — K29.00 ACUTE GASTRITIS WITHOUT HEMORRHAGE, UNSPECIFIED GASTRITIS TYPE: ICD-10-CM

## 2022-01-15 LAB
ALBUMIN SERPL BCP-MCNC: 4.9 G/DL (ref 3.2–4.9)
ALBUMIN/GLOB SERPL: 2 G/DL
ALP SERPL-CCNC: 79 U/L (ref 30–99)
ALT SERPL-CCNC: 17 U/L (ref 2–50)
ANION GAP SERPL CALC-SCNC: 14 MMOL/L (ref 7–16)
APPEARANCE UR: NORMAL
AST SERPL-CCNC: 13 U/L (ref 12–45)
BASOPHILS # BLD AUTO: 0.8 % (ref 0–1.8)
BASOPHILS # BLD: 0.08 K/UL (ref 0–0.12)
BILIRUB SERPL-MCNC: 0.7 MG/DL (ref 0.1–1.5)
BILIRUB UR STRIP-MCNC: NORMAL MG/DL
BUN SERPL-MCNC: 13 MG/DL (ref 8–22)
CALCIUM SERPL-MCNC: 9.8 MG/DL (ref 8.5–10.5)
CHLORIDE SERPL-SCNC: 102 MMOL/L (ref 96–112)
CO2 SERPL-SCNC: 23 MMOL/L (ref 20–33)
COLOR UR AUTO: YELLOW
CREAT SERPL-MCNC: 1.15 MG/DL (ref 0.5–1.4)
EOSINOPHIL # BLD AUTO: 0.06 K/UL (ref 0–0.51)
EOSINOPHIL NFR BLD: 0.6 % (ref 0–6.9)
ERYTHROCYTE [DISTWIDTH] IN BLOOD BY AUTOMATED COUNT: 36.7 FL (ref 35.9–50)
GLOBULIN SER CALC-MCNC: 2.5 G/DL (ref 1.9–3.5)
GLUCOSE SERPL-MCNC: 110 MG/DL (ref 65–99)
GLUCOSE UR STRIP.AUTO-MCNC: NORMAL MG/DL
HCT VFR BLD AUTO: 48.1 % (ref 42–52)
HGB BLD-MCNC: 17.3 G/DL (ref 14–18)
IMM GRANULOCYTES # BLD AUTO: 0.04 K/UL (ref 0–0.11)
IMM GRANULOCYTES NFR BLD AUTO: 0.4 % (ref 0–0.9)
KETONES UR STRIP.AUTO-MCNC: 80 MG/DL
LEUKOCYTE ESTERASE UR QL STRIP.AUTO: NORMAL
LIPASE SERPL-CCNC: 58 U/L (ref 11–82)
LYMPHOCYTES # BLD AUTO: 1.91 K/UL (ref 1–4.8)
LYMPHOCYTES NFR BLD: 20.1 % (ref 22–41)
MCH RBC QN AUTO: 29.9 PG (ref 27–33)
MCHC RBC AUTO-ENTMCNC: 36 G/DL (ref 33.7–35.3)
MCV RBC AUTO: 83.2 FL (ref 81.4–97.8)
MONOCYTES # BLD AUTO: 0.6 K/UL (ref 0–0.85)
MONOCYTES NFR BLD AUTO: 6.3 % (ref 0–13.4)
NEUTROPHILS # BLD AUTO: 6.8 K/UL (ref 1.82–7.42)
NEUTROPHILS NFR BLD: 71.8 % (ref 44–72)
NITRITE UR QL STRIP.AUTO: NORMAL
NRBC # BLD AUTO: 0 K/UL
NRBC BLD-RTO: 0 /100 WBC
PH UR STRIP.AUTO: 6 [PH] (ref 5–8)
PLATELET # BLD AUTO: 224 K/UL (ref 164–446)
PMV BLD AUTO: 10.3 FL (ref 9–12.9)
POTASSIUM SERPL-SCNC: 3.8 MMOL/L (ref 3.6–5.5)
PROT SERPL-MCNC: 7.4 G/DL (ref 6–8.2)
PROT UR QL STRIP: NORMAL MG/DL
RBC # BLD AUTO: 5.78 M/UL (ref 4.7–6.1)
RBC UR QL AUTO: NORMAL
SODIUM SERPL-SCNC: 139 MMOL/L (ref 135–145)
SP GR UR STRIP.AUTO: 1.02
UROBILINOGEN UR STRIP-MCNC: 0.2 MG/DL
WBC # BLD AUTO: 9.5 K/UL (ref 4.8–10.8)

## 2022-01-15 PROCEDURE — 85025 COMPLETE CBC W/AUTO DIFF WBC: CPT

## 2022-01-15 PROCEDURE — 80053 COMPREHEN METABOLIC PANEL: CPT

## 2022-01-15 PROCEDURE — 81002 URINALYSIS NONAUTO W/O SCOPE: CPT | Performed by: PHYSICIAN ASSISTANT

## 2022-01-15 PROCEDURE — 83690 ASSAY OF LIPASE: CPT

## 2022-01-15 PROCEDURE — 99214 OFFICE O/P EST MOD 30 MIN: CPT | Performed by: PHYSICIAN ASSISTANT

## 2022-01-15 RX ORDER — SUCRALFATE 1 G/1
1 TABLET ORAL
Qty: 120 TABLET | Refills: 0 | Status: SHIPPED | OUTPATIENT
Start: 2022-01-15 | End: 2022-02-11

## 2022-01-15 ASSESSMENT — ENCOUNTER SYMPTOMS
DIARRHEA: 0
HEADACHES: 0
NAUSEA: 0
COUGH: 0
BRUISES/BLEEDS EASILY: 0
FEVER: 0
VOMITING: 0
CONSTIPATION: 0
SHORTNESS OF BREATH: 0
DIZZINESS: 0
HEARTBURN: 1
BLOOD IN STOOL: 0
CHILLS: 0
ABDOMINAL PAIN: 1

## 2022-01-15 NOTE — PROGRESS NOTES
Subjective     Rony Gill is a 41 y.o. male who presents with Abdominal Pain (x 1 month off/on, upper abdominal pain and muscle weakness)    HPI:  Rony Gill is a 41 y.o. male who presents for evaluation of abdominal pain and muscle weakness.  Patient was initially seen for the same symptoms by his primary care provider on 1/4/2022.  He had H. pylori test done which was negative.  Patient also notes that he had some lab work done at [facility] but has not received those results.   He was started on Protonix and he is also taking famotidine which is not providing much improvement in his symptoms.  Patient notes that he continues to have fatigue and muscle weakness as well as decreased appetite he is not having any fever/chills, vomiting, blood in the stool, or black/tarry stools.  He says that the abdominal discomfort seems to be worse in the middle of the night and his weakness and fatigue seem to be worse in the mornings.        Review of Systems   Constitutional: Positive for malaise/fatigue. Negative for chills and fever.   Respiratory: Negative for cough and shortness of breath.    Cardiovascular: Negative for chest pain.   Gastrointestinal: Positive for abdominal pain and heartburn. Negative for blood in stool, constipation, diarrhea, melena, nausea and vomiting.   Genitourinary: Negative for dysuria, frequency and urgency.   Neurological: Negative for dizziness and headaches.   Endo/Heme/Allergies: Does not bruise/bleed easily.           PMH:  has a past medical history of Backache with radiation (2004), Chronic right-sided thoracic back pain (7/23/2017), Migraine headache without aura, and Tension headache (8/2/2017).  MEDS:   Current Outpatient Medications:   •  pantoprazole (PROTONIX) 40 MG Tablet Delayed Response, Take 1 Tablet by mouth every day., Disp: 30 Tablet, Rfl: 2  •  Famotidine (PEPCID PO), Take  by mouth., Disp: , Rfl:   •  rizatriptan (MAXALT) 5 MG tablet, Take 1 Tab by mouth Once PRN for  "Migraine for up to 1 dose., Disp: 10 Tab, Rfl: 3  ALLERGIES: No Known Allergies  SURGHX: No past surgical history on file.  SOCHX:  reports that he has never smoked. He has never used smokeless tobacco. He reports current alcohol use. He reports that he does not use drugs.  FH: Family history was reviewed, no pertinent findings to report      Objective     /80 (BP Location: Left arm, Patient Position: Sitting, BP Cuff Size: Large adult)   Pulse (!) 110   Temp 36.2 °C (97.2 °F) (Temporal)   Resp 16   Ht 1.778 m (5' 10\")   Wt 79.8 kg (176 lb)   SpO2 98%   BMI 25.25 kg/m²      Physical Exam  Constitutional:       Appearance: He is well-developed.   HENT:      Head: Normocephalic and atraumatic.      Right Ear: External ear normal.      Left Ear: External ear normal.   Eyes:      Conjunctiva/sclera: Conjunctivae normal.      Pupils: Pupils are equal, round, and reactive to light.   Cardiovascular:      Rate and Rhythm: Normal rate and regular rhythm.      Heart sounds: Normal heart sounds. No murmur heard.      Pulmonary:      Effort: Pulmonary effort is normal.      Breath sounds: Normal breath sounds. No wheezing.   Abdominal:      General: Abdomen is flat. Bowel sounds are normal.      Palpations: Abdomen is soft. There is no hepatomegaly or splenomegaly.      Tenderness: There is abdominal tenderness in the epigastric area and left upper quadrant. There is no right CVA tenderness, left CVA tenderness, guarding or rebound. Negative signs include Amezcua's sign.      Comments: Very mild tenderness in the epigastric region and left upper quadrant without rebound or guarding   Musculoskeletal:      Cervical back: Normal range of motion.   Lymphadenopathy:      Cervical: No cervical adenopathy.   Skin:     General: Skin is warm and dry.      Capillary Refill: Capillary refill takes less than 2 seconds.   Neurological:      Mental Status: He is alert and oriented to person, place, and time.   Psychiatric:    "      Behavior: Behavior normal.         Judgment: Judgment normal.         Assessment & Plan     1. Epigastric abdominal pain  - POCT Urinalysis  - CBC WITH DIFFERENTIAL; Future  - Comp Metabolic Panel; Future  - LIPASE; Future  - sucralfate (CARAFATE) 1 GM Tab; Take 1 Tablet by mouth 4 Times a Day,Before Meals and at Bedtime.  Dispense: 120 Tablet; Refill: 0    2. Acute gastritis without hemorrhage, unspecified gastritis type  - sucralfate (CARAFATE) 1 GM Tab; Take 1 Tablet by mouth 4 Times a Day,Before Meals and at Bedtime.  Dispense: 120 Tablet; Refill: 0      We will obtain a new CMP and CBC and will add on a lipase as well.  He will need to try to contact [facility] for the results of the celiac disease AB panel.  Also recommend he follow-up with his primary care next week for further evaluation and management of his symptoms.       My total time spent caring for the patient on the day of the encounter was 30 minutes.   This does not include time spent on separately billable procedures/tests.        Differential Diagnosis, natural history, and supportive care discussed. Return to the Urgent Care or follow up with your PCP if symptoms fail to resolve, or for any new or worsening symptoms. Emergency room precautions discussed. Patient and/or family appears understanding of information.

## 2022-01-18 ENCOUNTER — TELEMEDICINE (OUTPATIENT)
Dept: MEDICAL GROUP | Facility: PHYSICIAN GROUP | Age: 42
End: 2022-01-18
Payer: COMMERCIAL

## 2022-01-18 VITALS — RESPIRATION RATE: 14 BRPM | BODY MASS INDEX: 25.2 KG/M2 | HEIGHT: 70 IN | WEIGHT: 176 LBS

## 2022-01-18 DIAGNOSIS — M25.50 ARTHRALGIA, UNSPECIFIED JOINT: ICD-10-CM

## 2022-01-18 DIAGNOSIS — R10.12 LUQ PAIN: ICD-10-CM

## 2022-01-18 DIAGNOSIS — M79.10 MYALGIA: ICD-10-CM

## 2022-01-18 DIAGNOSIS — R53.1 WEAKNESS: ICD-10-CM

## 2022-01-18 PROCEDURE — 99214 OFFICE O/P EST MOD 30 MIN: CPT | Mod: 95 | Performed by: INTERNAL MEDICINE

## 2022-01-18 ASSESSMENT — FIBROSIS 4 INDEX: FIB4 SCORE: 0.55

## 2022-01-18 NOTE — PROGRESS NOTES
Virtual Visit: Established Patient   This visit was conducted via Zoom using secure and encrypted videoconferencing technology. The patient was in a private location in the state of [place].    The patient's identity was confirmed and verbal consent was obtained for this virtual visit.    Subjective:   CC:   Chief Complaint   Patient presents with   • Follow-Up     following up on weakness in muscles and joints       Rony Gill is a 41 y.o. male presenting for evaluation and management of:    The patient states that since her last visit he was seen in the ED on 1/15/2022 for worsening abdominal pain, as well as new onset fatigue and weakness.  Lab work was unremarkable.  No imaging was performed. The patient states that after our last visit he developed acute onset fatigue, generalized weakness, sore joints.  He reports pain in his elbows and knees bilaterally.  There is no swelling or redness of the joints.  He also reports continued intermittent headaches.  His abdominal pain has been stable, and is relatively mild, mainly causing food avoidance.  He states he stopped his PPI and has felt a little better.  He does recall previously being on a PPI and having to stop it for brain fog, he does not recall his other symptoms at that time.  He also reports a newer pain in his left abdomen between the rib cage and his hip.  He does not recall a trauma to the area.  He denies fevers, chills, sore throat, cough, shortness of breath, diarrhea or constipation, dysuria or urethral discharge.  He was seen in the ED on 1/15/2022 for worsening of his abdominal pain.      ROS   Denies any recent fevers or chills. No nausea or vomiting. No chest pains or shortness of breath.     No Known Allergies    Current medicines (including changes today)  Current Outpatient Medications   Medication Sig Dispense Refill   • sucralfate (CARAFATE) 1 GM Tab Take 1 Tablet by mouth 4 Times a Day,Before Meals and at Bedtime. 120 Tablet 0   •  "pantoprazole (PROTONIX) 40 MG Tablet Delayed Response Take 1 Tablet by mouth every day. 30 Tablet 2   • Famotidine (PEPCID PO) Take  by mouth.     • rizatriptan (MAXALT) 5 MG tablet Take 1 Tab by mouth Once PRN for Migraine for up to 1 dose. 10 Tab 3     No current facility-administered medications for this visit.       Patient Active Problem List    Diagnosis Date Noted   • Epigastric pain 01/12/2022   • GERD (gastroesophageal reflux disease) 08/02/2017   • Migraine syndrome 07/23/2017       Family History   Problem Relation Age of Onset   • Leukemia Sister    • No Known Problems Mother    • Heart Attack Father         5 years ago when he was 63 Y old       He  has a past medical history of Backache with radiation (2004), Chronic right-sided thoracic back pain (7/23/2017), Migraine headache without aura, and Tension headache (8/2/2017).  He  has no past surgical history on file.       Objective:   Resp 14   Ht 1.778 m (5' 10\")   Wt 79.8 kg (176 lb)   BMI 25.25 kg/m²     Physical Exam:  Constitutional: Alert, no distress, well-groomed.  Skin: No rashes in visible areas.  Eye: Round. Conjunctiva clear, lids normal. No icterus.   ENMT: Lips pink without lesions, good dentition, moist mucous membranes. Phonation normal.  Neck: No masses, no thyromegaly. Moves freely without pain.  Respiratory: Unlabored respiratory effort, no cough or audible wheeze  Psych: Alert and oriented x3, normal affect and mood.       Assessment and Plan:   The following treatment plan was discussed:      Weakness  Arthralgia, unspecified joint  Myalgia  LUQ pain  The patient's abdominal pain is chronic and progressive.  His associated symptoms of generalized weakness, myalgias, arthralgias are new in nature.  Etiology of the patient's symptoms is unclear at this time.  We will perform a comprehensive evaluation including labs for inflammation and autoimmunity as well as CT imaging of his abdomen and pelvis.  - Sed Rate; Future  - CRP " QUANTITIVE (NON-CARDIAC); Future  - TSH; Future  - FREE THYROXINE; Future  - VITAMIN D,25 HYDROXY; Future  - VITAMIN B12; Future  - PARVEZ ANTIBODY WITH REFLEX; Future  - RHEUMATOID ARTHRITIS FACTOR; Future  - CCP  - CREATINE KINASE; Future  - URINALYSIS,CULTURE IF INDICATED; Future  - CT-ABDOMEN-PELVIS WITH & W/O; Future    Follow-up: Return in about 2 weeks (around 2/1/2022) for f/u labs, symptoms.     Please note that this dictation was created using voice recognition software. I have made every reasonable attempt to correct obvious errors, but I expect that there are errors of grammar and possibly content that I did not discover before finalizing the note.

## 2022-01-19 ENCOUNTER — HOSPITAL ENCOUNTER (OUTPATIENT)
Dept: LAB | Facility: MEDICAL CENTER | Age: 42
End: 2022-01-19
Attending: INTERNAL MEDICINE
Payer: COMMERCIAL

## 2022-01-19 DIAGNOSIS — M79.10 MYALGIA: ICD-10-CM

## 2022-01-19 DIAGNOSIS — M25.50 ARTHRALGIA, UNSPECIFIED JOINT: ICD-10-CM

## 2022-01-19 DIAGNOSIS — R53.1 WEAKNESS: ICD-10-CM

## 2022-01-19 LAB
25(OH)D3 SERPL-MCNC: 56 NG/ML (ref 30–100)
AMORPH CRY #/AREA URNS HPF: PRESENT /HPF
APPEARANCE UR: ABNORMAL
BACTERIA #/AREA URNS HPF: NEGATIVE /HPF
BILIRUB UR QL STRIP.AUTO: ABNORMAL
CAOX CRY #/AREA URNS HPF: ABNORMAL /HPF
CK SERPL-CCNC: 46 U/L (ref 0–154)
COLOR UR: YELLOW
CRP SERPL HS-MCNC: <0.3 MG/DL (ref 0–0.75)
EPI CELLS #/AREA URNS HPF: NEGATIVE /HPF
ERYTHROCYTE [SEDIMENTATION RATE] IN BLOOD BY WESTERGREN METHOD: 1 MM/HOUR (ref 0–20)
GLUCOSE UR STRIP.AUTO-MCNC: NEGATIVE MG/DL
HYALINE CASTS #/AREA URNS LPF: ABNORMAL /LPF
KETONES UR STRIP.AUTO-MCNC: 15 MG/DL
LEUKOCYTE ESTERASE UR QL STRIP.AUTO: NEGATIVE
MICRO URNS: ABNORMAL
NITRITE UR QL STRIP.AUTO: NEGATIVE
PH UR STRIP.AUTO: 6 [PH] (ref 5–8)
PROT UR QL STRIP: NEGATIVE MG/DL
RBC # URNS HPF: ABNORMAL /HPF
RBC UR QL AUTO: NEGATIVE
RHEUMATOID FACT SER IA-ACNC: <10 IU/ML (ref 0–14)
SP GR UR STRIP.AUTO: >=1.03
T4 FREE SERPL-MCNC: 1.72 NG/DL (ref 0.93–1.7)
TSH SERPL DL<=0.005 MIU/L-ACNC: 1.58 UIU/ML (ref 0.38–5.33)
UROBILINOGEN UR STRIP.AUTO-MCNC: 0.2 MG/DL
VIT B12 SERPL-MCNC: 476 PG/ML (ref 211–911)
WBC #/AREA URNS HPF: ABNORMAL /HPF

## 2022-01-19 PROCEDURE — 82607 VITAMIN B-12: CPT

## 2022-01-19 PROCEDURE — 86431 RHEUMATOID FACTOR QUANT: CPT

## 2022-01-19 PROCEDURE — 86200 CCP ANTIBODY: CPT

## 2022-01-19 PROCEDURE — 82306 VITAMIN D 25 HYDROXY: CPT

## 2022-01-19 PROCEDURE — 86140 C-REACTIVE PROTEIN: CPT

## 2022-01-19 PROCEDURE — 85652 RBC SED RATE AUTOMATED: CPT

## 2022-01-19 PROCEDURE — 86038 ANTINUCLEAR ANTIBODIES: CPT

## 2022-01-19 PROCEDURE — 84439 ASSAY OF FREE THYROXINE: CPT

## 2022-01-19 PROCEDURE — 82550 ASSAY OF CK (CPK): CPT

## 2022-01-19 PROCEDURE — 84443 ASSAY THYROID STIM HORMONE: CPT

## 2022-01-19 PROCEDURE — 81001 URINALYSIS AUTO W/SCOPE: CPT

## 2022-01-19 PROCEDURE — 36415 COLL VENOUS BLD VENIPUNCTURE: CPT

## 2022-01-21 ENCOUNTER — OFFICE VISIT (OUTPATIENT)
Dept: URGENT CARE | Facility: PHYSICIAN GROUP | Age: 42
End: 2022-01-21
Payer: COMMERCIAL

## 2022-01-21 VITALS
RESPIRATION RATE: 20 BRPM | HEIGHT: 70 IN | DIASTOLIC BLOOD PRESSURE: 88 MMHG | SYSTOLIC BLOOD PRESSURE: 148 MMHG | BODY MASS INDEX: 25.05 KG/M2 | HEART RATE: 104 BPM | WEIGHT: 175 LBS | OXYGEN SATURATION: 97 % | TEMPERATURE: 96.7 F

## 2022-01-21 DIAGNOSIS — F41.9 ANXIETY: ICD-10-CM

## 2022-01-21 DIAGNOSIS — M94.0 COSTOCHONDRITIS: ICD-10-CM

## 2022-01-21 LAB
CCP IGG SERPL-ACNC: 2 UNITS (ref 0–19)
NUCLEAR IGG SER QL IA: NORMAL

## 2022-01-21 PROCEDURE — 93000 ELECTROCARDIOGRAM COMPLETE: CPT | Performed by: STUDENT IN AN ORGANIZED HEALTH CARE EDUCATION/TRAINING PROGRAM

## 2022-01-21 PROCEDURE — 99214 OFFICE O/P EST MOD 30 MIN: CPT | Performed by: STUDENT IN AN ORGANIZED HEALTH CARE EDUCATION/TRAINING PROGRAM

## 2022-01-21 ASSESSMENT — FIBROSIS 4 INDEX: FIB4 SCORE: 0.55

## 2022-01-22 NOTE — PROGRESS NOTES
"Subjective:   CHIEF COMPLAINT  Chief Complaint   Patient presents with   • Chest Pain     left; 30 mins        HPI  Rony Gill is a 41 y.o. male who presents with a chief complaint of left-sided chest pain.  Says his discomfort is well localized about the size of a quarter.  Patient is accompanied by his fiancée and they said they just purchased a house and has they were walking into their new home the patient developed symptoms.  Chest pain was described as a achy discomfort that lasted approximately 2 minutes and spontaneously resolved.  He then had recurrence of the chest pain while sitting in the new house and then came to urgent care for further evaluation.  He did not try taking any medications.  Admits associated symptoms of shortness of breath.  No jaw or arm pain.  No palpitations.  No syncope.  No nausea or vomiting.  No cough or wheezing.  Patient denies ever experiencing chest pain on exertion.  PMH of \"elevated cholesterol\" but not currently on a statin.  No HTN or DM.  Patient does not use tobacco.  FH includes dad with history of MI at age 60, suspected secondary to uncontrolled hypertension; dad is still alive    Interval history: Patient reports he has been experiencing underlying anxiety and possible panic attacks and believes this could be contributing to his symptoms.  Patient is accompanied by his fiancée who is a therapist and says he is not sleeping at night due to underlying anxiety/panic attacks.  He is not currently on any antianxiolytics/antidepressants.    REVIEW OF SYSTEMS  General: no fever or chills  GI: no nausea or vomiting  See HPI for further details.    PAST MEDICAL HISTORY  Patient Active Problem List    Diagnosis Date Noted   • Epigastric pain 01/12/2022   • GERD (gastroesophageal reflux disease) 08/02/2017   • Migraine syndrome 07/23/2017       SURGICAL HISTORY  patient denies any surgical history    ALLERGIES  No Known Allergies    CURRENT MEDICATIONS  Home Medications  " "   Reviewed by Jez Dover, Med Ass't (Medical Assistant) on 01/21/22 at 1842  Med List Status: <None>   Medication Last Dose Status   Famotidine (PEPCID PO) Taking Active   pantoprazole (PROTONIX) 40 MG Tablet Delayed Response Taking Active   rizatriptan (MAXALT) 5 MG tablet Taking Active   sucralfate (CARAFATE) 1 GM Tab Taking Active                SOCIAL HISTORY  Social History     Tobacco Use   • Smoking status: Never Smoker   • Smokeless tobacco: Never Used   Substance and Sexual Activity   • Alcohol use: Yes     Comment: socially   • Drug use: No   • Sexual activity: Not on file       FAMILY HISTORY  Family History   Problem Relation Age of Onset   • Leukemia Sister    • No Known Problems Mother    • Heart Attack Father         5 years ago when he was 63 Y old          Objective:   PHYSICAL EXAM  VITAL SIGNS: /88 (BP Location: Left arm, Patient Position: Sitting, BP Cuff Size: Adult)   Pulse (!) 104   Temp 35.9 °C (96.7 °F) (Temporal)   Resp 20   Ht 1.778 m (5' 10\")   Wt 79.4 kg (175 lb)   SpO2 97%   BMI 25.11 kg/m²     Gen: no acute distress, normal voice  Skin: dry, intact, moist mucosal membranes  Lungs: CTAB w/ symmetric expansion  CV: RRR w/o murmurs or clicks  MSK: Well localized TTP along anterior rib 5 just inferior and adjacent to the nipple.  No step-off or crepitus.  Psych: normal affect, normal judgement, alert, awake    Assessment/Plan:     1. Costochondritis  EKG - Clinic Performed   2. Anxiety  EKG - Clinic Performed   Localized tenderness to palpation consistent with costochondritis.  Certainly there is a component of underlying anxiety contributing to the patient's symptoms.  I ordered EKG but unfortunately the machine is not working and was unable to get a twelve-lead.  However given the patient is a 41-year-old male with no PMH/risk factors for CAD and never experiences exertional chest pain highly unlikely symptoms are due to an ischemic etiology.  I encouraged the patient " to follow-up with his primary care for reevaluation continue management of underlying anxiety.  Okay to try taking Tylenol for the costochondritis (he is avoiding NSAIDs due to gastritis).  If any point he develops new/worsening chest pain he was instructed to go to the emergency room vs alternative urgent care for twelve-lead EKG.  The patient and his fiancée understood everything discussed.  All questions were answered.    Differential diagnosis, natural history, supportive care, and indications for immediate follow-up discussed. All questions answered. Patient agrees with the plan of care.    Follow-up as needed if symptoms worsen or fail to improve to PCP, Urgent care or Emergency Room.    >30 minutes was spent caring for this patient on the day of the encounter which included face-to-face time, discussing the diagnosis, medical management, follow-up, emergency room precautions and completion of the chart. This does not include time spent on separately billable procedures/tests.      Please note that this dictation was created using voice recognition software. I have made a reasonable attempt to correct obvious errors, but I expect that there are errors of grammar and possibly content that I did not discover before finalizing the note.

## 2022-01-26 ENCOUNTER — HOSPITAL ENCOUNTER (OUTPATIENT)
Dept: RADIOLOGY | Facility: MEDICAL CENTER | Age: 42
End: 2022-01-26
Attending: INTERNAL MEDICINE
Payer: COMMERCIAL

## 2022-01-26 DIAGNOSIS — R10.12 LUQ PAIN: ICD-10-CM

## 2022-01-26 PROCEDURE — 74177 CT ABD & PELVIS W/CONTRAST: CPT

## 2022-01-26 PROCEDURE — 700117 HCHG RX CONTRAST REV CODE 255: Performed by: INTERNAL MEDICINE

## 2022-01-26 RX ADMIN — IOHEXOL 25 ML: 240 INJECTION, SOLUTION INTRATHECAL; INTRAVASCULAR; INTRAVENOUS; ORAL at 08:00

## 2022-01-26 RX ADMIN — IOHEXOL 100 ML: 350 INJECTION, SOLUTION INTRAVENOUS at 09:10

## 2022-01-26 NOTE — PROGRESS NOTES
Virtual Visit: Established Patient   This visit was conducted via Zoom using secure and encrypted videoconferencing technology. The patient was in a private location in the state of [place].    The patient's identity was confirmed and verbal consent was obtained for this virtual visit.    Subjective:   CC:   Chief Complaint   Patient presents with   • Follow-Up     Lab results    • Anxiety   • Back Pain     upper left side and middle, few weeks, noticed more recently.        Rony Gill is a 41 y.o. male presenting for evaluation and management of:    In the interim since his last visit the patient was seen in urgent care for chest pain.  He was diagnosed with possible costochondritis as well as anxiety.  He states that he also called 911 last night for chest pain and was told that it was panic/anxiety related.  He states that he is currently having left back pain which is going into his arm and feels that it is tricking him into thinking it is his heart.  He also reports some numbness in his pectoralis muscle on the left side of his neck.  He does report that he has been becoming increasingly bothered by his anxiousness, particularly at night.  His PHQ-9 score was 14.    ROS   Denies any recent fevers or chills. No nausea or vomiting. No chest pains or shortness of breath.     No Known Allergies    Current medicines (including changes today)  Current Outpatient Medications   Medication Sig Dispense Refill   • hydrOXYzine HCl (ATARAX) 25 MG Tab Take 1 Tablet by mouth 3 times a day as needed for Anxiety. 30 Tablet 0   • escitalopram (LEXAPRO) 10 MG Tab Take 1 Tablet by mouth every day. 30 Tablet 0   • pantoprazole (PROTONIX) 40 MG Tablet Delayed Response Take 1 Tablet by mouth every day. 30 Tablet 2   • Famotidine (PEPCID PO) Take  by mouth.     • rizatriptan (MAXALT) 5 MG tablet Take 1 Tab by mouth Once PRN for Migraine for up to 1 dose. 10 Tab 3   • sucralfate (CARAFATE) 1 GM Tab Take 1 Tablet by mouth 4 Times  "a Day,Before Meals and at Bedtime. (Patient not taking: Reported on 1/28/2022) 120 Tablet 0     No current facility-administered medications for this visit.       Patient Active Problem List    Diagnosis Date Noted   • Epigastric pain 01/12/2022   • GERD (gastroesophageal reflux disease) 08/02/2017   • Migraine syndrome 07/23/2017       Family History   Problem Relation Age of Onset   • Leukemia Sister    • No Known Problems Mother    • Heart Attack Father         5 years ago when he was 63 Y old       He  has a past medical history of Backache with radiation (2004), Chronic right-sided thoracic back pain (7/23/2017), Migraine headache without aura, and Tension headache (8/2/2017).  He  has no past surgical history on file.       Objective:   Resp 18   Ht 1.778 m (5' 10\") Comment: per pt  Wt 79.4 kg (175 lb) Comment: per pt  BMI 25.11 kg/m²     Physical Exam:  Constitutional: Alert, no distress, well-groomed.  Skin: No rashes in visible areas.  Eye: Round. Conjunctiva clear, lids normal. No icterus.   ENMT: Lips pink without lesions, good dentition, moist mucous membranes. Phonation normal.  Neck: No masses, no thyromegaly. Moves freely without pain.  Respiratory: Unlabored respiratory effort, no cough or audible wheeze  Psych: Alert and oriented x3, normal affect and mood.       Assessment and Plan:   The following treatment plan was discussed:     Anxiety  Panic attacks  This is an acute condition.  Progressive.  Patient with a number of somatic complaints.    Labs, including CBC, CMP, ESR, CRP, vitamin D, B12, PARVEZ, RF, CCP, CK have all been unremarkable.  H. pylori testing was negative.  Abdominal x-ray on 1/5/2022 showed no evidence of bowel obstruction, it did show moderate amount of stool within the colon.    CT abdomen and pelvis on 1/26/2022 was unremarkable.  The patient is scheduled to see gastroenterology for his GI symptoms in March.  -Start trial of escitalopram 10 mg daily and hydroxyzine as needed " for acute episodes of anxiety as well as insomnia, patient to follow-up in 1 month for symptom assessment  - hydrOXYzine HCl (ATARAX) 25 MG Tab; Take 1 Tablet by mouth 3 times a day as needed for Anxiety.  Dispense: 30 Tablet; Refill: 0  - escitalopram (LEXAPRO) 10 MG Tab; Take 1 Tablet by mouth every day.  Dispense: 30 Tablet; Refill: 0    High serum thyroxine (T4)  This is an acute condition.  Labs from 1/19/2022 showed a normal TSH of 1.58 with an elevated free T4 1.72.  Will repeat labs in 2 weeks.  - TSH; Future  - FREE THYROXINE; Future  - T3 FREE; Future  - TSI; Future  - THYROID PEROXIDASE  (TPO) AB; Future  - ANTITHYROGLOBULIN AB; Future    Follow-up: Return in about 4 weeks (around 2/25/2022) for Anxiety, thyroid labs.    Please note that this dictation was created using voice recognition software. I have made every reasonable attempt to correct obvious errors, but I expect that there are errors of grammar and possibly content that I did not discover before finalizing the note.

## 2022-01-28 ENCOUNTER — TELEMEDICINE (OUTPATIENT)
Dept: MEDICAL GROUP | Facility: PHYSICIAN GROUP | Age: 42
End: 2022-01-28
Payer: COMMERCIAL

## 2022-01-28 VITALS — RESPIRATION RATE: 18 BRPM | WEIGHT: 175 LBS | BODY MASS INDEX: 25.05 KG/M2 | HEIGHT: 70 IN

## 2022-01-28 DIAGNOSIS — F41.0 PANIC ATTACKS: ICD-10-CM

## 2022-01-28 DIAGNOSIS — R79.89 HIGH SERUM THYROXINE (T4): ICD-10-CM

## 2022-01-28 DIAGNOSIS — F41.9 ANXIETY: ICD-10-CM

## 2022-01-28 PROCEDURE — 99214 OFFICE O/P EST MOD 30 MIN: CPT | Mod: 95 | Performed by: INTERNAL MEDICINE

## 2022-01-28 RX ORDER — HYDROXYZINE HYDROCHLORIDE 25 MG/1
25 TABLET, FILM COATED ORAL 3 TIMES DAILY PRN
Qty: 30 TABLET | Refills: 0 | Status: SHIPPED | OUTPATIENT
Start: 2022-01-28 | End: 2023-07-12

## 2022-01-28 RX ORDER — ESCITALOPRAM OXALATE 10 MG/1
10 TABLET ORAL DAILY
Qty: 30 TABLET | Refills: 0 | Status: SHIPPED | OUTPATIENT
Start: 2022-01-28 | End: 2022-02-11

## 2022-01-28 ASSESSMENT — ANXIETY QUESTIONNAIRES
6. BECOMING EASILY ANNOYED OR IRRITABLE: MORE THAN HALF THE DAYS
3. WORRYING TOO MUCH ABOUT DIFFERENT THINGS: NEARLY EVERY DAY
1. FEELING NERVOUS, ANXIOUS, OR ON EDGE: NEARLY EVERY DAY
IF YOU CHECKED OFF ANY PROBLEMS ON THIS QUESTIONNAIRE, HOW DIFFICULT HAVE THESE PROBLEMS MADE IT FOR YOU TO DO YOUR WORK, TAKE CARE OF THINGS AT HOME, OR GET ALONG WITH OTHER PEOPLE: VERY DIFFICULT
2. NOT BEING ABLE TO STOP OR CONTROL WORRYING: NEARLY EVERY DAY
4. TROUBLE RELAXING: NEARLY EVERY DAY
GAD7 TOTAL SCORE: 20
7. FEELING AFRAID AS IF SOMETHING AWFUL MIGHT HAPPEN: NEARLY EVERY DAY
5. BEING SO RESTLESS THAT IT IS HARD TO SIT STILL: NEARLY EVERY DAY

## 2022-01-28 ASSESSMENT — PATIENT HEALTH QUESTIONNAIRE - PHQ9
CLINICAL INTERPRETATION OF PHQ2 SCORE: 1
5. POOR APPETITE OR OVEREATING: 3 - NEARLY EVERY DAY
SUM OF ALL RESPONSES TO PHQ QUESTIONS 1-9: 14

## 2022-01-28 ASSESSMENT — FIBROSIS 4 INDEX: FIB4 SCORE: 0.55

## 2022-02-09 ENCOUNTER — OFFICE VISIT (OUTPATIENT)
Dept: MEDICAL GROUP | Facility: PHYSICIAN GROUP | Age: 42
End: 2022-02-09
Payer: COMMERCIAL

## 2022-02-09 VITALS
DIASTOLIC BLOOD PRESSURE: 84 MMHG | BODY MASS INDEX: 23.77 KG/M2 | HEIGHT: 70 IN | OXYGEN SATURATION: 97 % | HEART RATE: 116 BPM | WEIGHT: 166 LBS | RESPIRATION RATE: 16 BRPM | SYSTOLIC BLOOD PRESSURE: 126 MMHG | TEMPERATURE: 98.3 F

## 2022-02-09 DIAGNOSIS — F41.9 ANXIETY: ICD-10-CM

## 2022-02-09 DIAGNOSIS — R00.2 PALPITATIONS: ICD-10-CM

## 2022-02-09 DIAGNOSIS — F41.0 PANIC ATTACKS: ICD-10-CM

## 2022-02-09 DIAGNOSIS — R79.89 HIGH SERUM THYROXINE (T4): ICD-10-CM

## 2022-02-09 DIAGNOSIS — M54.50 ACUTE RIGHT-SIDED LOW BACK PAIN WITHOUT SCIATICA: ICD-10-CM

## 2022-02-09 PROCEDURE — 99214 OFFICE O/P EST MOD 30 MIN: CPT | Performed by: INTERNAL MEDICINE

## 2022-02-09 ASSESSMENT — FIBROSIS 4 INDEX: FIB4 SCORE: 0.55

## 2022-02-09 NOTE — PROGRESS NOTES
Subjective:     CC:   Chief Complaint   Patient presents with   • Follow-Up   • Heart Problem     infrequent, feel like heart drops down to stomach, lightheadedness, x 1 week    • Back Pain     lower middle, x 1 month or more        HPI:   Rony presents today to discuss the following issues:    The patient states he was unable to tolerate the escitalopram, and he stopped it after the first dose.  He feels that his anxiety is subsiding.  He does continue to get to get a sensation of his heart dropping in his chest.  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  He states it is now occurring more frequently.  It was initially happening only in the evenings, it is now happening throughout the day.  He has been unable to identify a trigger.  He also reports a 1 month history of pain in the center and just to the right of his mid lower back.  No antecedent trauma.  The pain does not radiate down his leg.  He has been sitting in his lounge chair and laying in his bed more frequently due to his variety of symptoms.  He continues to acknowledge that his symptoms may be psychosomatic in nature, but he is still concerned about them.    Past Medical History:   Diagnosis Date   • Backache with radiation 2004   • Chronic right-sided thoracic back pain 7/23/2017   • Migraine headache without aura    • Tension headache 8/2/2017       Social History     Tobacco Use   • Smoking status: Never Smoker   • Smokeless tobacco: Never Used   Vaping Use   • Vaping Use: Never used   Substance Use Topics   • Alcohol use: Yes     Comment: socially   • Drug use: No       Current Outpatient Medications Ordered in Epic   Medication Sig Dispense Refill   • hydrOXYzine HCl (ATARAX) 25 MG Tab Take 1 Tablet by mouth 3 times a day as needed for Anxiety. 30 Tablet 0   • Famotidine (PEPCID PO) Take  by mouth.     • rizatriptan (MAXALT) 5 MG tablet Take 1 Tab by mouth Once PRN  "for Migraine for up to 1 dose. 10 Tab 3   • escitalopram (LEXAPRO) 10 MG Tab Take 1 Tablet by mouth every day. (Patient not taking: Reported on 2/9/2022) 30 Tablet 0   • sucralfate (CARAFATE) 1 GM Tab Take 1 Tablet by mouth 4 Times a Day,Before Meals and at Bedtime. (Patient not taking: Reported on 1/28/2022) 120 Tablet 0   • pantoprazole (PROTONIX) 40 MG Tablet Delayed Response Take 1 Tablet by mouth every day. (Patient not taking: Reported on 2/9/2022) 30 Tablet 2     No current Epic-ordered facility-administered medications on file.       Allergies:  Patient has no known allergies.    Health Maintenance: Completed    ROS:   Denies any recent fevers or chills. No nausea or vomiting. No chest pains or shortness of breath.      Objective:       Exam:  /84   Pulse (!) 116   Temp 36.8 °C (98.3 °F) (Temporal)   Resp 16   Ht 1.778 m (5' 10\")   Wt 75.3 kg (166 lb)   SpO2 97%   BMI 23.82 kg/m²  Body mass index is 23.82 kg/m².    Gen: Alert and oriented, No apparent distress.      Assessment & Plan:     41 y.o. male with the following -     Acute right-sided low back pain without sciatica  This is an acute condition.  Stable.  No red flag symptoms present.  Likely secondary to the patient's more sedentary lifestyle recently.  - DX-LUMBAR SPINE-2 OR 3 VIEWS; Future    Palpitations  This is an acute condition.  Progressive.  Patient reporting frequent episodes of the sensation that his heart is dropping down in his chest.  It is also associated with lightheadedness.  It is happening multiple times per day, so we should be able to capture it on a 48-hour Holter.  - Cardiac Event Monitor; Future    High serum thyroxine (T4)  This is an acute condition.  Labs from 1/19/2022 showed a normal TSH of 1.58 with an elevated free T4 1.72.  Will repeat labs in 2 weeks.  - TSH; Future  - FREE THYROXINE; Future  - T3 FREE; Future  - TSI; Future  - THYROID PEROXIDASE  (TPO) AB; Future  - ANTITHYROGLOBULIN AB; " Future     Anxiety  Panic attacks  This is a chronic condition.  Stable.   The patient continues to have a number of somatic complaints involving multiple organ systems.  He does acknowledge that this could be psychosomatic in nature, but he is still concerned about them and would like to have them evaluated.  He was unable to tolerate a trial of escitalopram.  He has not used in hydroxyzine.  Labs, including CBC, CMP, ESR, CRP, vitamin D, B12, PARVEZ, RF, CCP, CK have all been unremarkable.  H. pylori testing was negative.  Abdominal x-ray on 1/5/2022 showed no evidence of bowel obstruction, it did show moderate amount of stool within the colon.  CT abdomen and pelvis on 1/26/2022 was unremarkable.  The patient is scheduled to see gastroenterology for his GI symptoms in March.  We will continue to address the patient's symptoms in order to identify a physical cause and if unable to find a physical etiology, provide the patient with some reassurance that his symptoms may be psychosomatic in nature.      Return in about 4 weeks (around 3/9/2022) for f/u labs.    Please note that this dictation was created using voice recognition software. I have made every reasonable attempt to correct obvious errors, but I expect that there are errors of grammar and possibly content that I did not discover before finalizing the note.

## 2022-02-11 PROBLEM — F41.9 ANXIETY: Status: ACTIVE | Noted: 2022-02-11

## 2022-02-11 PROBLEM — F41.0 PANIC ATTACKS: Status: ACTIVE | Noted: 2022-02-11

## 2022-02-11 PROBLEM — R00.2 PALPITATIONS: Status: ACTIVE | Noted: 2022-02-11

## 2022-02-11 PROBLEM — M54.50 ACUTE RIGHT-SIDED LOW BACK PAIN WITHOUT SCIATICA: Status: ACTIVE | Noted: 2022-02-11

## 2022-02-11 PROBLEM — R79.89 HIGH SERUM THYROXINE (T4): Status: ACTIVE | Noted: 2022-02-11

## 2022-02-16 ENCOUNTER — HOSPITAL ENCOUNTER (OUTPATIENT)
Dept: RADIOLOGY | Facility: MEDICAL CENTER | Age: 42
End: 2022-02-16
Attending: INTERNAL MEDICINE
Payer: COMMERCIAL

## 2022-02-16 DIAGNOSIS — M54.50 ACUTE RIGHT-SIDED LOW BACK PAIN WITHOUT SCIATICA: ICD-10-CM

## 2022-02-16 PROCEDURE — 72100 X-RAY EXAM L-S SPINE 2/3 VWS: CPT

## 2022-02-19 LAB
T3 SERPL-MCNC: 106 NG/DL (ref 71–180)
T4 FREE SERPL-MCNC: 1.4 NG/DL (ref 0.82–1.77)
TSH SERPL DL<=0.005 MIU/L-ACNC: 0.86 UIU/ML (ref 0.45–4.5)

## 2022-04-14 ENCOUNTER — OFFICE VISIT (OUTPATIENT)
Dept: MEDICAL GROUP | Facility: PHYSICIAN GROUP | Age: 42
End: 2022-04-14
Payer: COMMERCIAL

## 2022-04-14 VITALS
SYSTOLIC BLOOD PRESSURE: 138 MMHG | DIASTOLIC BLOOD PRESSURE: 76 MMHG | TEMPERATURE: 98 F | WEIGHT: 153.13 LBS | RESPIRATION RATE: 20 BRPM | OXYGEN SATURATION: 98 % | HEART RATE: 98 BPM | HEIGHT: 70 IN | BODY MASS INDEX: 21.92 KG/M2

## 2022-04-14 DIAGNOSIS — R00.2 PALPITATIONS: ICD-10-CM

## 2022-04-14 DIAGNOSIS — R20.0 NUMBNESS AND TINGLING: ICD-10-CM

## 2022-04-14 DIAGNOSIS — F41.9 ANXIETY: ICD-10-CM

## 2022-04-14 DIAGNOSIS — R20.2 NUMBNESS AND TINGLING: ICD-10-CM

## 2022-04-14 DIAGNOSIS — R79.89 HIGH SERUM THYROXINE (T4): ICD-10-CM

## 2022-04-14 DIAGNOSIS — F41.0 PANIC ATTACKS: ICD-10-CM

## 2022-04-14 PROCEDURE — 99214 OFFICE O/P EST MOD 30 MIN: CPT | Performed by: INTERNAL MEDICINE

## 2022-04-14 ASSESSMENT — FIBROSIS 4 INDEX: FIB4 SCORE: 0.55

## 2022-04-14 NOTE — PROGRESS NOTES
Subjective:     CC:   Chief Complaint   Patient presents with   • Otalgia   • Dizziness   • Vertigo       HPI:   Rony presents today to discuss the following issues:    The patient reports that in the interim since his last visit his anxiety significantly improved.  His palpitations diminished so he did not schedule the cardiac event monitor. He now reports bilateral shoulder pain with numbness and tingling in his bilateral arms.  He also reports a tingling sensation in his legs bilaterally.  He is getting some dizziness and left ear pain.  He states he had a terrible panic attack today.  He has noticed that when he senses an abnormal body function it will then cause anxiety and has the potential to progress to a full-blown panic attack.      Past Medical History:   Diagnosis Date   • Backache with radiation 2004   • Chronic right-sided thoracic back pain 7/23/2017   • Migraine headache without aura    • Tension headache 8/2/2017       Social History     Tobacco Use   • Smoking status: Never Smoker   • Smokeless tobacco: Never Used   Vaping Use   • Vaping Use: Never used   Substance Use Topics   • Alcohol use: Yes     Comment: socially   • Drug use: No       Current Outpatient Medications Ordered in Epic   Medication Sig Dispense Refill   • hydrOXYzine HCl (ATARAX) 25 MG Tab Take 1 Tablet by mouth 3 times a day as needed for Anxiety. 30 Tablet 0   • Famotidine (PEPCID PO) Take  by mouth.     • rizatriptan (MAXALT) 5 MG tablet Take 1 Tab by mouth Once PRN for Migraine for up to 1 dose. 10 Tab 3     No current Epic-ordered facility-administered medications on file.       Allergies:  Patient has no known allergies.    Health Maintenance: Completed    ROS:   Denies any recent fevers or chills. No nausea or vomiting. No chest pains or shortness of breath.      Objective:       Exam:  /76 (BP Location: Left arm, Patient Position: Sitting, BP Cuff Size: Adult)   Pulse 98   Temp 36.7 °C (98 °F) (Temporal)   Resp 20  "  Ht 1.778 m (5' 10\")   Wt 69.5 kg (153 lb 2 oz)   SpO2 98%   BMI 21.97 kg/m²  Body mass index is 21.97 kg/m².    Gen: Alert and oriented, No apparent distress.  ENT: External auditory canals and TMs normal bilaterally        Assessment & Plan:     41 y.o. male with the following -     Numbness and tingling  Acute medical condition.  The patient reports bilateral shoulder pain with numbness and tingling in his bilateral arms.  He also reports a tingling sensation in his legs bilaterally.  He is getting some dizziness and left ear pain.    - DX-CERVICAL SPINE-2 OR 3 VIEWS; Future    Anxiety  Panic attacks  Chronic medical condition.  Recurrent. The patient reports that in the interim since his last visit his anxiety significantly improved.  His palpitations diminished so he did not schedule the cardiac event monitor.  He now reports a new constellation of symptoms including bilateral shoulder pain with numbness and tingling in his arms and legs bilaterally.  He is also having some dizziness with left ear pain.  He states he had a severe panic attack today.  He reports a pattern that when he senses an abnormal bodily function it will then cause anxiety and has the potential to progress to a full-blown panic attack.  The patient has previously presented with a number of somatic complaints involving multiple organ systems. He does acknowledge that this could be psychosomatic in nature, but he is still concerned about them and would like to have them evaluated.  He was unable to tolerate a trial of escitalopram. Labs, including CBC, CMP, ESR, CRP, vitamin D, B12, PARVEZ, RF, CCP, CK have all been unremarkable.  H. pylori testing was negative.  Abdominal x-ray on 1/5/2022 showed no evidence of bowel obstruction, it did show moderate amount of stool within the colon.  CT abdomen and pelvis on 1/26/2022 was unremarkable.  We will continue to address the patient's symptoms in order to identify a primary physical cause and " if unable to find a physical etiology, provide the patient with some reassurance that his symptoms may be psychosomatic in nature.    Palpitations  Chronic medical condition.  Recurrent.  The patient's symptoms had previously subsided so he did not schedule a cardiac event monitor.  We had previously ordered a 48-hour Holter monitor as the patient was having symptoms multiple times per day, but now they have decreased in frequency so I am changing the order to a 2-week Zio patch.  - Cardiac Event Monitor; Future     High serum thyroxine (T4)  Acute medical condition.  Resolved.  Labs from 1/19/2022 showed a normal TSH of 1.58 with an elevated free T4 1.72.  Will repeat labs in 2 weeks.  Repeat labs on 2/18/2022 showed a normal TSH of 0.861, normal free T4 1.4, and normal T3 of 106.  Antibody testing was not performed hormone levels normalized.     I spent a total of 30 minutes with record review, exam, communication with the patient, communication with other providers, and documentation of this encounter.      Return in about 3 months (around 7/14/2022), or if symptoms worsen or fail to improve, for 3-month f/u visit.    Please note that this dictation was created using voice recognition software. I have made every reasonable attempt to correct obvious errors, but I expect that there are errors of grammar and possibly content that I did not discover before finalizing the note.

## 2022-04-18 ENCOUNTER — HOSPITAL ENCOUNTER (OUTPATIENT)
Dept: RADIOLOGY | Facility: MEDICAL CENTER | Age: 42
End: 2022-04-18
Attending: INTERNAL MEDICINE
Payer: COMMERCIAL

## 2022-04-18 ENCOUNTER — NON-PROVIDER VISIT (OUTPATIENT)
Dept: CARDIOLOGY | Facility: MEDICAL CENTER | Age: 42
End: 2022-04-18
Attending: INTERNAL MEDICINE
Payer: COMMERCIAL

## 2022-04-18 DIAGNOSIS — R20.0 NUMBNESS AND TINGLING: ICD-10-CM

## 2022-04-18 DIAGNOSIS — R20.2 NUMBNESS AND TINGLING: ICD-10-CM

## 2022-04-18 DIAGNOSIS — I49.1 APC (ATRIAL PREMATURE CONTRACTIONS): ICD-10-CM

## 2022-04-18 DIAGNOSIS — R00.2 PALPITATIONS: ICD-10-CM

## 2022-04-18 DIAGNOSIS — R00.0 SINUS TACHYCARDIA: ICD-10-CM

## 2022-04-18 DIAGNOSIS — I49.3 PVC'S (PREMATURE VENTRICULAR CONTRACTIONS): ICD-10-CM

## 2022-04-18 PROCEDURE — 72040 X-RAY EXAM NECK SPINE 2-3 VW: CPT

## 2022-04-18 NOTE — PROGRESS NOTES
Patient enrolled in the 14 day ePatch Holter monitoring program from [employer] per Melissa Carrizales M.D.  >In clinic hook up, monitor serial #[id number].  >Pending EOS.

## 2022-04-24 NOTE — PROGRESS NOTES
"Subjective:     CC:   Chief Complaint   Patient presents with   • Follow-Up     Heart palpitations   • Vertigo     X 5 days         HPI:   Rony presents today to discuss the following issues:    The patient continues to report progressive numbness and tingling in his bilateral arms and legs.  He also reports leg weakness, bilaterally.  In addition, he is having episodes of dizziness and visual disturbances.  He is currently wearing his Zio patch for his palpitations.  He continues to report palpitations, anxiety, and panic attacks related to his physical symptoms.      Past Medical History:   Diagnosis Date   • Backache with radiation 2004   • Chronic right-sided thoracic back pain 7/23/2017   • Migraine headache without aura    • Tension headache 8/2/2017       Social History     Tobacco Use   • Smoking status: Never Smoker   • Smokeless tobacco: Never Used   Vaping Use   • Vaping Use: Never used   Substance Use Topics   • Alcohol use: Yes     Comment: socially   • Drug use: No       Current Outpatient Medications Ordered in Epic   Medication Sig Dispense Refill   • hydrOXYzine HCl (ATARAX) 25 MG Tab Take 1 Tablet by mouth 3 times a day as needed for Anxiety. 30 Tablet 0   • Famotidine (PEPCID PO) Take  by mouth.     • rizatriptan (MAXALT) 5 MG tablet Take 1 Tab by mouth Once PRN for Migraine for up to 1 dose. 10 Tab 3     No current Epic-ordered facility-administered medications on file.       Allergies:  Patient has no known allergies.    Health Maintenance: Completed    ROS:   Denies any recent fevers or chills. No nausea or vomiting. No chest pains or shortness of breath.      Objective:       Exam:  /78 (BP Location: Right arm, Patient Position: Sitting, BP Cuff Size: Adult)   Pulse 94   Temp 36.4 °C (97.6 °F) (Temporal)   Resp 18   Ht 1.778 m (5' 10\")   Wt 69.2 kg (152 lb 9.6 oz)   SpO2 99%   BMI 21.90 kg/m²  Body mass index is 21.9 kg/m².    Gen: Alert and oriented, No apparent " distress.  Lungs: Normal effort, CTA bilaterally, no wheezes, rhonchi, or rales  CV: Regular rate and rhythm. No murmurs, rubs, or gallops.  Ext: No clubbing, cyanosis, edema.  Neuro: Cranial nerves II through XII intact bilaterally, sensation equal throughout, full strength throughout, hyperreflexic symmetrically        Assessment & Plan:     41 y.o. male with the following -     Paresthesias  Vertigo  Blurry vision, bilateral  Nonintractable episodic headache, unspecified headache type  Chronic medical condition.  Progressive.  The patient reports progressive numbness and tingling in his bilateral arms and legs.  The sensation tends to be worse in the mornings when he awakens.  It is associated with bilateral shoulder pain.  He also reports bilateral leg weakness.  He also has chronic headaches.  He is now having dizziness and visual disturbances as well.  Cervical spine x-ray on 4/18/2022 was normal.  - MR-STEALTH BRAIN WITH & W/O; Future    Palpitations  Chronic medical condition.  Progressive.  The patient is currently wearing his Zio patch, will await the results.  He is reporting palpitations while wearing the monitor.      I spent a total of 27 minutes with record review, exam, communication with the patient, communication with other providers, and documentation of this encounter.      Return in about 2 weeks (around 5/10/2022) for MRI results.    Please note that this dictation was created using voice recognition software. I have made every reasonable attempt to correct obvious errors, but I expect that there are errors of grammar and possibly content that I did not discover before finalizing the note.

## 2022-04-26 ENCOUNTER — OFFICE VISIT (OUTPATIENT)
Dept: MEDICAL GROUP | Facility: PHYSICIAN GROUP | Age: 42
End: 2022-04-26
Payer: COMMERCIAL

## 2022-04-26 VITALS
WEIGHT: 152.6 LBS | RESPIRATION RATE: 18 BRPM | DIASTOLIC BLOOD PRESSURE: 78 MMHG | HEIGHT: 70 IN | SYSTOLIC BLOOD PRESSURE: 126 MMHG | HEART RATE: 94 BPM | BODY MASS INDEX: 21.85 KG/M2 | TEMPERATURE: 97.6 F | OXYGEN SATURATION: 99 %

## 2022-04-26 DIAGNOSIS — R51.9 NONINTRACTABLE EPISODIC HEADACHE, UNSPECIFIED HEADACHE TYPE: ICD-10-CM

## 2022-04-26 DIAGNOSIS — R20.2 PARESTHESIAS: ICD-10-CM

## 2022-04-26 DIAGNOSIS — R42 VERTIGO: ICD-10-CM

## 2022-04-26 DIAGNOSIS — H53.8 BLURRY VISION, BILATERAL: ICD-10-CM

## 2022-04-26 PROCEDURE — 99213 OFFICE O/P EST LOW 20 MIN: CPT | Performed by: INTERNAL MEDICINE

## 2022-04-26 ASSESSMENT — FIBROSIS 4 INDEX: FIB4 SCORE: 0.55

## 2022-05-04 PROBLEM — H53.8 BLURRY VISION, BILATERAL: Status: ACTIVE | Noted: 2022-05-04

## 2022-05-04 PROBLEM — R20.2 PARESTHESIAS: Status: ACTIVE | Noted: 2022-05-04

## 2022-05-04 PROBLEM — R42 VERTIGO: Status: ACTIVE | Noted: 2022-05-04

## 2022-05-06 ENCOUNTER — TELEPHONE (OUTPATIENT)
Dept: CARDIOLOGY | Facility: MEDICAL CENTER | Age: 42
End: 2022-05-06
Payer: COMMERCIAL

## 2022-05-06 PROCEDURE — 93228 REMOTE 30 DAY ECG REV/REPORT: CPT | Performed by: INTERNAL MEDICINE

## 2022-05-10 ENCOUNTER — HOSPITAL ENCOUNTER (OUTPATIENT)
Dept: RADIOLOGY | Facility: MEDICAL CENTER | Age: 42
End: 2022-05-10
Attending: INTERNAL MEDICINE
Payer: COMMERCIAL

## 2022-05-10 DIAGNOSIS — R20.2 PARESTHESIAS: ICD-10-CM

## 2022-05-10 DIAGNOSIS — R51.9 NONINTRACTABLE EPISODIC HEADACHE, UNSPECIFIED HEADACHE TYPE: ICD-10-CM

## 2022-05-10 PROCEDURE — A9576 INJ PROHANCE MULTIPACK: HCPCS | Performed by: INTERNAL MEDICINE

## 2022-05-10 PROCEDURE — 700117 HCHG RX CONTRAST REV CODE 255: Performed by: INTERNAL MEDICINE

## 2022-05-10 PROCEDURE — 70553 MRI BRAIN STEM W/O & W/DYE: CPT

## 2022-05-10 RX ADMIN — GADOTERIDOL 15 ML: 279.3 INJECTION, SOLUTION INTRAVENOUS at 10:30

## 2022-05-11 ENCOUNTER — OFFICE VISIT (OUTPATIENT)
Dept: MEDICAL GROUP | Facility: PHYSICIAN GROUP | Age: 42
End: 2022-05-11
Payer: COMMERCIAL

## 2022-05-11 VITALS
DIASTOLIC BLOOD PRESSURE: 68 MMHG | OXYGEN SATURATION: 99 % | SYSTOLIC BLOOD PRESSURE: 116 MMHG | BODY MASS INDEX: 21.19 KG/M2 | HEART RATE: 96 BPM | TEMPERATURE: 97.7 F | RESPIRATION RATE: 16 BRPM | WEIGHT: 148 LBS | HEIGHT: 70 IN

## 2022-05-11 DIAGNOSIS — H53.8 BLURRY VISION, BILATERAL: ICD-10-CM

## 2022-05-11 DIAGNOSIS — F41.9 ANXIETY: ICD-10-CM

## 2022-05-11 DIAGNOSIS — F41.0 PANIC ATTACKS: ICD-10-CM

## 2022-05-11 DIAGNOSIS — R20.2 PARESTHESIAS: ICD-10-CM

## 2022-05-11 DIAGNOSIS — R00.2 PALPITATIONS: ICD-10-CM

## 2022-05-11 DIAGNOSIS — R42 VERTIGO: ICD-10-CM

## 2022-05-11 PROCEDURE — 99214 OFFICE O/P EST MOD 30 MIN: CPT | Performed by: INTERNAL MEDICINE

## 2022-05-11 RX ORDER — ESCITALOPRAM OXALATE 5 MG/1
5 TABLET ORAL DAILY
Qty: 30 TABLET | Refills: 1 | Status: SHIPPED | OUTPATIENT
Start: 2022-05-11 | End: 2022-05-17

## 2022-05-11 ASSESSMENT — FIBROSIS 4 INDEX: FIB4 SCORE: 0.55

## 2022-05-17 RX ORDER — SERTRALINE HYDROCHLORIDE 25 MG/1
25 TABLET, FILM COATED ORAL DAILY
Qty: 30 TABLET | Refills: 2 | Status: SHIPPED | OUTPATIENT
Start: 2022-05-17 | End: 2022-06-14 | Stop reason: SDUPTHER

## 2022-06-12 NOTE — PROGRESS NOTES
GettingSubjective:     CC:   Chief Complaint   Patient presents with   • Follow-Up     Medication follow up         HPI:   Rony presents today to discuss the follow issues:     The patient was unable to tolerate escitalopram as it was causing him to have body jerking and shaking at night.  He was switched to sertraline and reports this is occurring less frequently, but he also has to take the medication in the morning.  He overall is reporting improvement in his palpitations and panic attacks.  He does still report some bilateral jaw pain.  In addition, he is having some weakness, numbness and tingling and some dizziness.  The weakness, numbness and tingling occur on all 4 extremities at different times.  He is also aware of lumps in his bilateral axillary region.  He would like to have Lyme testing done as he has read it can be related to the symptoms he is having.      Past Medical History:   Diagnosis Date   • Backache with radiation 2004   • Chronic right-sided thoracic back pain 7/23/2017   • Migraine headache without aura    • Tension headache 8/2/2017       Social History     Tobacco Use   • Smoking status: Never Smoker   • Smokeless tobacco: Never Used   Vaping Use   • Vaping Use: Never used   Substance Use Topics   • Alcohol use: Yes     Comment: socially   • Drug use: No       Current Outpatient Medications Ordered in Epic   Medication Sig Dispense Refill   • sertraline (ZOLOFT) 25 MG tablet Take 1 Tablet by mouth every day. 90 Tablet 3   • hydrOXYzine HCl (ATARAX) 25 MG Tab Take 1 Tablet by mouth 3 times a day as needed for Anxiety. 30 Tablet 0   • Famotidine (PEPCID PO) Take  by mouth.     • rizatriptan (MAXALT) 5 MG tablet Take 1 Tab by mouth Once PRN for Migraine for up to 1 dose. (Patient not taking: Reported on 6/14/2022) 10 Tab 3     No current Epic-ordered facility-administered medications on file.       Allergies:  Patient has no known allergies.    Health Maintenance: Completed    ROS:   Denies  "any recent fevers or chills. No nausea or vomiting. No chest pains or shortness of breath.      Objective:       Exam:  /60 (BP Location: Right arm, Patient Position: Sitting, BP Cuff Size: Adult)   Pulse 95   Temp 36.2 °C (97.2 °F) (Temporal)   Resp 18   Ht 1.778 m (5' 10\")   Wt 67 kg (147 lb 9.6 oz)   SpO2 98%   BMI 21.18 kg/m²  Body mass index is 21.18 kg/m².    Gen: Alert and oriented, No apparent distress.  Lungs: Normal effort, CTA bilaterally, no wheezes, rhonchi, or rales  CV: Regular rate and rhythm. No murmurs, rubs, or gallops.  Ext: No clubbing, cyanosis, edema.        Assessment & Plan:     41 y.o. male with the following -     Anxiety  Panic attacks  Chronic medical condition.   Improved.    At the patient's last visit was started on escitalopram 5 mg daily.  He was unable to tolerate the medication because it was causing body jerking at night.  He has now been switched to sertraline 25 mg daily and reports improvement in that side effect.  He is also taking the medication in the morning.  He overall reports improvement in his palpitations and panic attacks.  He does still report some somatic complaints such as bilateral jaw pain, dizziness, episodic weakness, numbness and tingling of his extremities.  He is requesting Lyme testing to be done as he is read it can be associated with the symptoms he is having.  -Continue sertraline 25 mg daily, would like for patient to increase up to 50 mg as tolerated for greater improvement in his symptoms  - LYME AB/WESTERN BLOT REFLEX  - sertraline (ZOLOFT) 25 MG tablet; Take 1 Tablet by mouth every day.  Dispense: 90 Tablet; Refill: 3     Palpitations  Chronic medical condition.  Improved. Cardiac event monitor on 5/6/2022 showed predominantly sinus rhythm with an average heart rate of 82, range , rare PACs, rare PVCs.  The patient does report still feeling strong heartbeats occasionally at night.    Paresthesias  Chronic medical condition.  The " patient continues to report numbness and tingling over different body parts as well as associated weakness at times.  The patient has had an extensive lab evaluation including a normal ESR, CRP, CK, PARVEZ, RF, and CCP.      Return in about 3 months (around 9/14/2022) for Anxiety medication.    Please note that this dictation was created using voice recognition software. I have made every reasonable attempt to correct obvious errors, but I expect that there are errors of grammar and possibly content that I did not discover before finalizing the note.

## 2022-06-14 ENCOUNTER — OFFICE VISIT (OUTPATIENT)
Dept: MEDICAL GROUP | Facility: PHYSICIAN GROUP | Age: 42
End: 2022-06-14
Payer: COMMERCIAL

## 2022-06-14 VITALS
BODY MASS INDEX: 21.13 KG/M2 | RESPIRATION RATE: 18 BRPM | DIASTOLIC BLOOD PRESSURE: 60 MMHG | TEMPERATURE: 97.2 F | WEIGHT: 147.6 LBS | OXYGEN SATURATION: 98 % | HEIGHT: 70 IN | SYSTOLIC BLOOD PRESSURE: 124 MMHG | HEART RATE: 95 BPM

## 2022-06-14 DIAGNOSIS — R20.2 PARESTHESIAS: ICD-10-CM

## 2022-06-14 DIAGNOSIS — F41.9 ANXIETY: ICD-10-CM

## 2022-06-14 DIAGNOSIS — R00.2 PALPITATIONS: ICD-10-CM

## 2022-06-14 DIAGNOSIS — F41.0 PANIC ATTACKS: ICD-10-CM

## 2022-06-14 PROCEDURE — 99214 OFFICE O/P EST MOD 30 MIN: CPT | Performed by: INTERNAL MEDICINE

## 2022-06-14 RX ORDER — SERTRALINE HYDROCHLORIDE 25 MG/1
25 TABLET, FILM COATED ORAL DAILY
Qty: 90 TABLET | Refills: 3 | Status: SHIPPED | OUTPATIENT
Start: 2022-06-14 | End: 2022-07-07

## 2022-06-14 ASSESSMENT — FIBROSIS 4 INDEX: FIB4 SCORE: 0.55

## 2022-06-17 LAB — B BURGDOR IGG+IGM SER QL IA: NEGATIVE

## 2023-07-09 NOTE — PROGRESS NOTES
"Subjective:     CC:   Chief Complaint   Patient presents with    Medication Management     sertraline (ZOLOFT) 50 MG Tab - not sure about the dosage and want to discuss it     Medication Refill     omeprazole (PRILOSEC) 20 MG delayed-release capsule         HPI:   Rony presents today for follow-up visit for medication refills. The patient is currently taking sertraline 50 mg daily.  He reports this is effectively controlling his symptoms.  He has not had any recent panic attacks. The patient currently takes omeprazole 20 mg daily for GERD.  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      Past Medical History:   Diagnosis Date    Backache with radiation 2004    Chronic right-sided thoracic back pain 7/23/2017    Migraine headache without aura     Tension headache 8/2/2017       Social History     Tobacco Use    Smoking status: Never    Smokeless tobacco: Never   Vaping Use    Vaping Use: Never used   Substance Use Topics    Alcohol use: Yes     Comment: socially    Drug use: No       Current Outpatient Medications Ordered in Epic   Medication Sig Dispense Refill    omeprazole (PRILOSEC) 20 MG delayed-release capsule Take 1 Capsule by mouth every day. 90 Capsule 3    sertraline (ZOLOFT) 25 MG tablet Take 2 Tablets by mouth every day. 180 Tablet 3     No current Epic-ordered facility-administered medications on file.       Allergies:  Patient has no known allergies.    Health Maintenance: Completed    Review of Systems:  No fevers or chills. No cough, chest pain, or shortness of breath.       Objective:       Exam:  /68   Pulse 89   Temp 36.9 °C (98.4 °F) (Temporal)   Ht 1.778 m (5' 10\")   Wt 82.1 kg (181 lb)   SpO2 98%   BMI 25.97 kg/m²  Body mass index is 25.97 kg/m².    Gen: Alert and oriented, No apparent distress.  Lungs: Normal effort, CTA bilaterally, no wheezes, rhonchi, or rales  CV: Regular rate and rhythm. No murmurs, rubs, or gallops.  Ext: No clubbing, cyanosis, edema.        Assessment & " Plan:     42 y.o. male with the following -     Anxiety  Panic attacks  Chronic condition, improved.   The patient is currently taking sertraline 50 mg daily.  He reports this is effectively controlling his symptoms.  He has not had any recent panic attacks.  -Continue current regimen of sertraline 50 mg daily, refill given at today's visit  - sertraline (ZOLOFT) 25 MG tablet; Take 2 Tablets by mouth every day.  Dispense: 180 Tablet; Refill: 3    Gastroesophageal reflux disease without esophagitis  Chronic condition, controlled.  The patient currently takes omeprazole 20 mg daily.  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  -Continue current regimen of omeprazole 20 mg daily  - omeprazole (PRILOSEC) 20 MG delayed-release capsule; Take 1 Capsule by mouth every day.  Dispense: 90 Capsule; Refill: 3    High serum thyroxine (T4)  - TSH; Future  - FREE THYROXINE; Future  - T3 FREE; Future    Screening for deficiency anemia  - CBC WITH DIFFERENTIAL; Future    Encounter for screening for diabetes mellitus  - Comp Metabolic Panel; Future    Screening for cardiovascular condition  - Lipid Profile; Future      Return in about 1 year (around 7/12/2024) for Annual/Wellness Visit.    Please note that this dictation was created using voice recognition software. I have made every reasonable attempt to correct obvious errors, but I expect that there are errors of grammar and possibly content that I did not discover before finalizing the note.

## 2023-07-12 ENCOUNTER — OFFICE VISIT (OUTPATIENT)
Dept: MEDICAL GROUP | Facility: PHYSICIAN GROUP | Age: 43
End: 2023-07-12
Payer: COMMERCIAL

## 2023-07-12 VITALS
HEART RATE: 89 BPM | DIASTOLIC BLOOD PRESSURE: 68 MMHG | WEIGHT: 181 LBS | TEMPERATURE: 98.4 F | HEIGHT: 70 IN | SYSTOLIC BLOOD PRESSURE: 126 MMHG | BODY MASS INDEX: 25.91 KG/M2 | OXYGEN SATURATION: 98 %

## 2023-07-12 DIAGNOSIS — K21.9 GASTROESOPHAGEAL REFLUX DISEASE WITHOUT ESOPHAGITIS: ICD-10-CM

## 2023-07-12 DIAGNOSIS — Z13.6 SCREENING FOR CARDIOVASCULAR CONDITION: ICD-10-CM

## 2023-07-12 DIAGNOSIS — R79.89 HIGH SERUM THYROXINE (T4): ICD-10-CM

## 2023-07-12 DIAGNOSIS — F41.9 ANXIETY: ICD-10-CM

## 2023-07-12 DIAGNOSIS — Z13.1 ENCOUNTER FOR SCREENING FOR DIABETES MELLITUS: ICD-10-CM

## 2023-07-12 DIAGNOSIS — F41.0 PANIC ATTACKS: ICD-10-CM

## 2023-07-12 DIAGNOSIS — Z13.0 SCREENING FOR DEFICIENCY ANEMIA: ICD-10-CM

## 2023-07-12 PROCEDURE — 99214 OFFICE O/P EST MOD 30 MIN: CPT | Performed by: INTERNAL MEDICINE

## 2023-07-12 PROCEDURE — 3078F DIAST BP <80 MM HG: CPT | Performed by: INTERNAL MEDICINE

## 2023-07-12 PROCEDURE — 3074F SYST BP LT 130 MM HG: CPT | Performed by: INTERNAL MEDICINE

## 2023-07-12 RX ORDER — SERTRALINE HYDROCHLORIDE 25 MG/1
50 TABLET, FILM COATED ORAL DAILY
Qty: 180 TABLET | Refills: 3 | Status: SHIPPED | OUTPATIENT
Start: 2023-07-12

## 2023-07-12 RX ORDER — OMEPRAZOLE 20 MG/1
CAPSULE, DELAYED RELEASE ORAL
COMMUNITY
Start: 2023-06-17 | End: 2023-07-12 | Stop reason: SDUPTHER

## 2023-07-12 RX ORDER — OMEPRAZOLE 20 MG/1
20 CAPSULE, DELAYED RELEASE ORAL DAILY
Qty: 90 CAPSULE | Refills: 3 | Status: SHIPPED | OUTPATIENT
Start: 2023-07-12

## 2023-07-12 ASSESSMENT — FIBROSIS 4 INDEX: FIB4 SCORE: 0.56

## 2024-07-17 DIAGNOSIS — K21.9 GASTROESOPHAGEAL REFLUX DISEASE WITHOUT ESOPHAGITIS: ICD-10-CM

## 2024-07-17 RX ORDER — OMEPRAZOLE 20 MG/1
20 CAPSULE, DELAYED RELEASE ORAL DAILY
Qty: 90 CAPSULE | Refills: 3 | Status: SHIPPED | OUTPATIENT
Start: 2024-07-17

## 2024-08-14 SDOH — ECONOMIC STABILITY: FOOD INSECURITY: WITHIN THE PAST 12 MONTHS, YOU WORRIED THAT YOUR FOOD WOULD RUN OUT BEFORE YOU GOT MONEY TO BUY MORE.: NEVER TRUE

## 2024-08-14 SDOH — HEALTH STABILITY: PHYSICAL HEALTH: ON AVERAGE, HOW MANY MINUTES DO YOU ENGAGE IN EXERCISE AT THIS LEVEL?: 40 MIN

## 2024-08-14 SDOH — ECONOMIC STABILITY: HOUSING INSECURITY
IN THE LAST 12 MONTHS, WAS THERE A TIME WHEN YOU DID NOT HAVE A STEADY PLACE TO SLEEP OR SLEPT IN A SHELTER (INCLUDING NOW)?: NO

## 2024-08-14 SDOH — ECONOMIC STABILITY: TRANSPORTATION INSECURITY
IN THE PAST 12 MONTHS, HAS LACK OF TRANSPORTATION KEPT YOU FROM MEETINGS, WORK, OR FROM GETTING THINGS NEEDED FOR DAILY LIVING?: NO

## 2024-08-14 SDOH — ECONOMIC STABILITY: INCOME INSECURITY: HOW HARD IS IT FOR YOU TO PAY FOR THE VERY BASICS LIKE FOOD, HOUSING, MEDICAL CARE, AND HEATING?: NOT HARD AT ALL

## 2024-08-14 SDOH — ECONOMIC STABILITY: TRANSPORTATION INSECURITY
IN THE PAST 12 MONTHS, HAS LACK OF RELIABLE TRANSPORTATION KEPT YOU FROM MEDICAL APPOINTMENTS, MEETINGS, WORK OR FROM GETTING THINGS NEEDED FOR DAILY LIVING?: NO

## 2024-08-14 SDOH — ECONOMIC STABILITY: INCOME INSECURITY: IN THE LAST 12 MONTHS, WAS THERE A TIME WHEN YOU WERE NOT ABLE TO PAY THE MORTGAGE OR RENT ON TIME?: NO

## 2024-08-14 SDOH — ECONOMIC STABILITY: TRANSPORTATION INSECURITY
IN THE PAST 12 MONTHS, HAS THE LACK OF TRANSPORTATION KEPT YOU FROM MEDICAL APPOINTMENTS OR FROM GETTING MEDICATIONS?: NO

## 2024-08-14 SDOH — HEALTH STABILITY: PHYSICAL HEALTH: ON AVERAGE, HOW MANY DAYS PER WEEK DO YOU ENGAGE IN MODERATE TO STRENUOUS EXERCISE (LIKE A BRISK WALK)?: 3 DAYS

## 2024-08-14 SDOH — ECONOMIC STABILITY: FOOD INSECURITY: WITHIN THE PAST 12 MONTHS, THE FOOD YOU BOUGHT JUST DIDN'T LAST AND YOU DIDN'T HAVE MONEY TO GET MORE.: NEVER TRUE

## 2024-08-14 SDOH — HEALTH STABILITY: MENTAL HEALTH
STRESS IS WHEN SOMEONE FEELS TENSE, NERVOUS, ANXIOUS, OR CAN'T SLEEP AT NIGHT BECAUSE THEIR MIND IS TROUBLED. HOW STRESSED ARE YOU?: ONLY A LITTLE

## 2024-08-14 ASSESSMENT — LIFESTYLE VARIABLES
HOW OFTEN DO YOU HAVE SIX OR MORE DRINKS ON ONE OCCASION: LESS THAN MONTHLY
HOW OFTEN DO YOU HAVE A DRINK CONTAINING ALCOHOL: 2-4 TIMES A MONTH
SKIP TO QUESTIONS 9-10: 0
AUDIT-C TOTAL SCORE: 3
HOW MANY STANDARD DRINKS CONTAINING ALCOHOL DO YOU HAVE ON A TYPICAL DAY: 1 OR 2

## 2024-08-14 ASSESSMENT — SOCIAL DETERMINANTS OF HEALTH (SDOH)
HOW OFTEN DO YOU ATTEND CHURCH OR RELIGIOUS SERVICES?: 1 TO 4 TIMES PER YEAR
HOW HARD IS IT FOR YOU TO PAY FOR THE VERY BASICS LIKE FOOD, HOUSING, MEDICAL CARE, AND HEATING?: NOT HARD AT ALL
HOW OFTEN DO YOU ATTENT MEETINGS OF THE CLUB OR ORGANIZATION YOU BELONG TO?: NEVER
WITHIN THE PAST 12 MONTHS, YOU WORRIED THAT YOUR FOOD WOULD RUN OUT BEFORE YOU GOT THE MONEY TO BUY MORE: NEVER TRUE
IN A TYPICAL WEEK, HOW MANY TIMES DO YOU TALK ON THE PHONE WITH FAMILY, FRIENDS, OR NEIGHBORS?: ONCE A WEEK
HOW OFTEN DO YOU ATTEND CHURCH OR RELIGIOUS SERVICES?: 1 TO 4 TIMES PER YEAR
HOW OFTEN DO YOU GET TOGETHER WITH FRIENDS OR RELATIVES?: PATIENT DECLINED
DO YOU BELONG TO ANY CLUBS OR ORGANIZATIONS SUCH AS CHURCH GROUPS UNIONS, FRATERNAL OR ATHLETIC GROUPS, OR SCHOOL GROUPS?: NO
HOW OFTEN DO YOU HAVE A DRINK CONTAINING ALCOHOL: 2-4 TIMES A MONTH
DO YOU BELONG TO ANY CLUBS OR ORGANIZATIONS SUCH AS CHURCH GROUPS UNIONS, FRATERNAL OR ATHLETIC GROUPS, OR SCHOOL GROUPS?: NO
HOW OFTEN DO YOU ATTENT MEETINGS OF THE CLUB OR ORGANIZATION YOU BELONG TO?: NEVER
HOW OFTEN DO YOU HAVE SIX OR MORE DRINKS ON ONE OCCASION: LESS THAN MONTHLY
IN A TYPICAL WEEK, HOW MANY TIMES DO YOU TALK ON THE PHONE WITH FAMILY, FRIENDS, OR NEIGHBORS?: ONCE A WEEK
HOW MANY DRINKS CONTAINING ALCOHOL DO YOU HAVE ON A TYPICAL DAY WHEN YOU ARE DRINKING: 1 OR 2
IN THE PAST 12 MONTHS, HAS THE ELECTRIC, GAS, OIL, OR WATER COMPANY THREATENED TO SHUT OFF SERVICE IN YOUR HOME?: NO
HOW OFTEN DO YOU GET TOGETHER WITH FRIENDS OR RELATIVES?: PATIENT DECLINED

## 2024-08-14 NOTE — PROGRESS NOTES
Verbal consent was acquired by the patient to use [employer] ambient listening note generation during this visit Yes     Subjective:     CC:   Chief Complaint   Patient presents with    Annual Exam         HPI:   History of Present Illness  Rony Gill is a 43-year-old male who presents for annual wellness visit.He reports overall good health, having recently recovered from COVID-19.  He has discontinued Zoloft, with the last dose taken on Monday, and continues to feel well.  He continues to take omeprazole which he finds effective.His diet is generally healthy, although he admits to occasional indulgences.  He is actively training for a half marathon scheduled for October 2024, exercising 3 to 4 times a week.  He expresses surprise at is weight today, attributing it to his recent cruise and illness.  He consumes alcohol 2 to 3 times a week.He reports no gastrointestinal issues and has not undergone a colonoscopy.  He is up-to-date with his eye exams and dental check-ups.         Past Medical History:   Diagnosis Date    Backache with radiation 2004    Chronic right-sided thoracic back pain 7/23/2017    Migraine headache without aura     Tension headache 8/2/2017       Social History     Tobacco Use    Smoking status: Never    Smokeless tobacco: Never   Vaping Use    Vaping status: Never Used   Substance Use Topics    Alcohol use: Yes     Alcohol/week: 1.2 - 1.8 oz     Types: 2 - 3 Standard drinks or equivalent per week     Comment: socially    Drug use: No       Current Outpatient Medications Ordered in Epic   Medication Sig Dispense Refill    omeprazole (PRILOSEC) 20 MG delayed-release capsule Take 1 Capsule by mouth every day. 90 Capsule 3     No current [facility]-ordered facility-administered medications on file.       Allergies:  Patient has no known allergies.    Health Maintenance: Completed    Review of Systems:  Constitutional: Negative for fever, chills.  Respiratory: Negative for cough and shortness of  "breath.    Cardiovascular: Negative for chest pain or palpitations.  Gastrointestinal: Negative for abdominal pain, nausea, vomiting, and diarrhea.   Neurological: Negative for headaches, numbness, or tingling.  Psychiatric: Negative for anxiety or depression.      Objective:       Exam:  /70   Pulse 87   Temp 36.3 °C (97.3 °F) (Temporal)   Ht 1.778 m (5' 10\")   Wt 85.7 kg (189 lb)   SpO2 98%   BMI 27.12 kg/m²  Body mass index is 27.12 kg/m².    Constitutional: Well-developed, no acute distress.  HEENT: Pupils are equal, round, and reactive to light. Oropharynx is without erythema, edema or exudates.   Neck: No thyromegaly or palpable thyroid nodules. No cervical or supraclavicular lymphadenopathy noted.  Lungs: Clear to auscultation bilaterally. No wheezes, rhonchi, or rales.   Cardiovascular: Regular rate and rhythm, no murmurs noted.   Abdomen: Soft, nontender, nondistended.   Extremities: No edema or erythema.  Psychiatric:  Behavior, mood, and affect are appropriate.    Assessment & Plan:     43 y.o. male with the following -     Wellness examination  The patient feels well and denies any complaints. There are no concerning signs and/or symptoms of any significant disease process.  Normal exam findings.  The patient was counseled about regular exercise, healthy diet, abstinence from smoking, drugs, and excessive alcohol.   - CBC WITH DIFFERENTIAL; Future  - Comp Metabolic Panel; Future  - HEMOGLOBIN A1C; Future  - TSH; Future  - T3 FREE; Future  - FREE THYROXINE; Future  - Lipid Profile; Future    Gastroesophageal reflux disease, unspecified whether esophagitis present  Chronic condition, controlled.  The patient continues to take omeprazole 20 mg daily.  He states it effectively controls his symptoms.  -Continue current regimen of omeprazole 20 mg daily    Anxiety  Panic attacks  Chronic condition, improved.  The patient has been able to successfully taper off of his sertraline recently.  He feels " fine so far and has not had any recurrent panic attacks.    Need for hepatitis C screening test  - HEP C VIRUS ANTIBODY; Future    Encounter for screening for HIV  - HIV AG/AB COMBO ASSAY SCREENING; Future    HCM: Completed  Labs per orders  Immunizations per orders  The patient was counseled about regular exercise, healthy diet, abstinence from smoking, drugs, and excessive alcohol.      Return in about 1 year (around 8/15/2025) for Annual/Wellness Visit.    Please note that this dictation was created using voice recognition software. I have made every reasonable attempt to correct obvious errors, but I expect that there are errors of grammar and possibly content that I did not discover before finalizing the note.

## 2024-08-15 ENCOUNTER — APPOINTMENT (OUTPATIENT)
Dept: MEDICAL GROUP | Facility: PHYSICIAN GROUP | Age: 44
End: 2024-08-15
Payer: COMMERCIAL

## 2024-08-15 VITALS
HEART RATE: 87 BPM | DIASTOLIC BLOOD PRESSURE: 70 MMHG | BODY MASS INDEX: 27.06 KG/M2 | TEMPERATURE: 97.3 F | WEIGHT: 189 LBS | OXYGEN SATURATION: 98 % | HEIGHT: 70 IN | SYSTOLIC BLOOD PRESSURE: 112 MMHG

## 2024-08-15 DIAGNOSIS — F41.0 PANIC ATTACKS: ICD-10-CM

## 2024-08-15 DIAGNOSIS — Z00.00 WELLNESS EXAMINATION: ICD-10-CM

## 2024-08-15 DIAGNOSIS — F41.9 ANXIETY: ICD-10-CM

## 2024-08-15 DIAGNOSIS — Z11.4 ENCOUNTER FOR SCREENING FOR HIV: ICD-10-CM

## 2024-08-15 DIAGNOSIS — Z11.59 NEED FOR HEPATITIS C SCREENING TEST: ICD-10-CM

## 2024-08-15 DIAGNOSIS — K21.9 GASTROESOPHAGEAL REFLUX DISEASE, UNSPECIFIED WHETHER ESOPHAGITIS PRESENT: ICD-10-CM

## 2024-08-15 PROCEDURE — 3078F DIAST BP <80 MM HG: CPT | Performed by: INTERNAL MEDICINE

## 2024-08-15 PROCEDURE — 3074F SYST BP LT 130 MM HG: CPT | Performed by: INTERNAL MEDICINE

## 2024-08-15 PROCEDURE — 99396 PREV VISIT EST AGE 40-64: CPT | Performed by: INTERNAL MEDICINE

## 2024-08-15 ASSESSMENT — PATIENT HEALTH QUESTIONNAIRE - PHQ9: CLINICAL INTERPRETATION OF PHQ2 SCORE: 0
